# Patient Record
Sex: FEMALE | Race: BLACK OR AFRICAN AMERICAN | NOT HISPANIC OR LATINO | Employment: PART TIME | URBAN - METROPOLITAN AREA
[De-identification: names, ages, dates, MRNs, and addresses within clinical notes are randomized per-mention and may not be internally consistent; named-entity substitution may affect disease eponyms.]

---

## 2017-01-10 ENCOUNTER — TELEPHONE (OUTPATIENT)
Dept: FAMILY MEDICINE | Facility: CLINIC | Age: 52
End: 2017-01-10

## 2017-01-10 NOTE — TELEPHONE ENCOUNTER
I wrote a letter on her behalf.  I cannot say unequivocally that she cannot climb stairs as I have not seen her for almost 3 years.

## 2017-01-10 NOTE — TELEPHONE ENCOUNTER
[facility] phone call message- general phone call:    Reason for call: Pt is in [facility], needs a letter stating she can not do the stairs due to arthritis in both knees. Please call Argenis who is nurse at Center at [phone]. Fax # is [phone].     Return call needed: Yes    OK to leave a message on voice mail? Yes    Primary language: English      needed? No    Call taken on January 10, 2017 at 8:25 AM by Ann Monsivais

## 2017-11-27 ENCOUNTER — OFFICE VISIT (OUTPATIENT)
Dept: FAMILY MEDICINE | Facility: CLINIC | Age: 52
End: 2017-11-27

## 2017-11-27 VITALS
BODY MASS INDEX: 33.16 KG/M2 | HEART RATE: 80 BPM | DIASTOLIC BLOOD PRESSURE: 69 MMHG | HEIGHT: 64 IN | SYSTOLIC BLOOD PRESSURE: 102 MMHG | TEMPERATURE: 98 F | OXYGEN SATURATION: 98 % | WEIGHT: 194.25 LBS

## 2017-11-27 DIAGNOSIS — F19.90 SUBSTANCE USE DISORDER: ICD-10-CM

## 2017-11-27 DIAGNOSIS — F32.1 MODERATE SINGLE CURRENT EPISODE OF MAJOR DEPRESSIVE DISORDER (H): ICD-10-CM

## 2017-11-27 DIAGNOSIS — S49.91XA SHOULDER INJURY, RIGHT, INITIAL ENCOUNTER: Primary | ICD-10-CM

## 2017-11-27 RX ORDER — CITALOPRAM HYDROBROMIDE 10 MG/1
10 TABLET ORAL DAILY
Qty: 30 TABLET | Refills: 1 | Status: SHIPPED | OUTPATIENT
Start: 2017-11-27 | End: 2018-12-18

## 2017-11-27 RX ORDER — CYCLOBENZAPRINE HCL 10 MG
5-10 TABLET ORAL 3 TIMES DAILY PRN
Qty: 30 TABLET | Refills: 1 | Status: SHIPPED | OUTPATIENT
Start: 2017-11-27 | End: 2018-12-18

## 2017-11-27 RX ORDER — NABUMETONE 500 MG/1
1000 TABLET, FILM COATED ORAL DAILY
Qty: 60 TABLET | Refills: 1 | Status: SHIPPED | OUTPATIENT
Start: 2017-11-27 | End: 2018-12-18

## 2017-11-27 RX ORDER — HYDROXYZINE PAMOATE 25 MG/1
25-50 CAPSULE ORAL 3 TIMES DAILY PRN
Qty: 120 CAPSULE | Refills: 1 | Status: SHIPPED | OUTPATIENT
Start: 2017-11-27 | End: 2018-12-18

## 2017-11-27 ASSESSMENT — ANXIETY QUESTIONNAIRES
7. FEELING AFRAID AS IF SOMETHING AWFUL MIGHT HAPPEN: NEARLY EVERY DAY
GAD7 TOTAL SCORE: 20
1. FEELING NERVOUS, ANXIOUS, OR ON EDGE: NEARLY EVERY DAY
3. WORRYING TOO MUCH ABOUT DIFFERENT THINGS: NEARLY EVERY DAY
6. BECOMING EASILY ANNOYED OR IRRITABLE: NEARLY EVERY DAY
2. NOT BEING ABLE TO STOP OR CONTROL WORRYING: NEARLY EVERY DAY
IF YOU CHECKED OFF ANY PROBLEMS ON THIS QUESTIONNAIRE, HOW DIFFICULT HAVE THESE PROBLEMS MADE IT FOR YOU TO DO YOUR WORK, TAKE CARE OF THINGS AT HOME, OR GET ALONG WITH OTHER PEOPLE: EXTREMELY DIFFICULT
5. BEING SO RESTLESS THAT IT IS HARD TO SIT STILL: MORE THAN HALF THE DAYS

## 2017-11-27 ASSESSMENT — PATIENT HEALTH QUESTIONNAIRE - PHQ9
5. POOR APPETITE OR OVEREATING: NEARLY EVERY DAY
SUM OF ALL RESPONSES TO PHQ QUESTIONS 1-9: 18

## 2017-11-27 NOTE — LETTER
RETURN TO WORK/SCHOOL FORM    11/27/2017    Re: Martha Caputo  1965      To Whom It May Concern:     Martha Caputo was seen in clinic today.  She may return to work with restrictions on 11/28/17          Restrictions:  limited to light duty - lifting no greater than 5lbs with right arm.      Chapo Hwang, DO  11/27/2017 4:00 PM

## 2017-11-27 NOTE — PROGRESS NOTES
SUBJECTIVE       Martha Caputo is a 52 year old  female with a PMH significant for:     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)     Breast cancer screening     Breast pain, left     DJD (degenerative joint disease) of knee     Personal history of malignant neoplasm of breast     Primary osteoarthritis of both knees     Major depressive disorder, recurrent episode, moderate (H)     Patient presents with:  Shoulder Pain: right - fell about a month ago - pain comes and goes - decreased strength and control which is causing issues with work -    Drug Problem: crack   Alcohol Problem: wants to seek treatment  Referral: therapy - PTSD, depression, anxiety, anger issues  Referral: to have port removed post breast cancer      1) She says she tripped on her own feet 1 month ago and fell.  She landed on her right shoulder and has had pain since.  It is worse with movement and reaching above head or behind back.  She works as a  and has been unable to work due to pain.  She has trouble sleeping at night. Tried ibuprofen, naproxen and tylenol. Had some bruising which is now gone.    2) She has a history of cocaine and alcohol addiction and unfortunately relapsed lately.  She is interested in treatment and would like resources/referral. She also says her depressive symptoms are bad again.  She thinks this is independent of her substance use.  She has a lot of generalized anxiety, trouble sleeping, lots of fatigue, and poor motivation lately. She was previously on wellbutrin which was not very helpful. In the past was on celexa which she thinks helped her anxiety.  Hx of suicidal thoughts in the past with prior attempts, but denied any thoughts, plan, or intent recently.  She feels safe at home.    PMH, Medications and Allergies were reviewed and updated as needed.    ROS: No numbness or weakness.  No edema. She does not some muscle spasms  "in her shoulder/neck.        OBJECTIVE     Vitals:    11/27/17 1515   BP: 102/69   Pulse: 80   Temp: 98  F (36.7  C)   TempSrc: Oral   SpO2: 98%   Weight: 194 lb 4 oz (88.1 kg)   Height: 5' 4.17\" (163 cm)     Body mass index is 33.16 kg/(m^2).    General :  healthy and alert, no distress  HEENT:  PERRL  Cardiovascular: regular rate and rhythm, no gallops, rubs or murmurs   Respiratory:  lungs clear, no rales, rhonchi or wheezes, normal diaphragmatic excursion  Musculoskeletal: Right shoulder normal to visual inspection, tender over superior aspect of shoulder joint and along trap muscle. Reduced active ROM especially when reaching behind back or above head due to pain.  Rotator cuff testing: limited by pain with empty can testing, but strength seemed intact.  Normal subscap and bicep strength.  Normal  strength.  Skin:   no lesions or rashes   Neurological:  normal gait, no gross defects.  Normal RUE sensation. Negative spurlings.  Psychiatric:  appropriate mood and affect                        ASSESSMENT AND PLAN     (S49.91XA) Shoulder injury, right, initial encounter  (primary encounter diagnosis)  Comment: Pain for last 3 weeks following a fall.  Given minimal improvement we did an x-ray today and preliminary read is normal. Will start with some physical therapy and try nabumetone (advised avoiding other NSAIDs while on this) in addition to tylenol.  Will try flexeril for spasms, hopefully to help her sleep.  Will consider further imaging if not improving in therapy after a few weeks.  Plan: PHYSICAL THERAPY REFERRAL, XR Shoulder Right         G/E 3 Views, nabumetone (RELAFEN) 500 MG         tablet, cyclobenzaprine (FLEXERIL) 10 MG tablet          (F32.1) Moderate single current episode of major depressive disorder (H)  Comment: Worsening symptoms lately.  Discussed options and she opted to restart medication.  Celexa helped in the past so will try that again.  Will provide vistaril prn for anxiety as well. "  Consider getting into therapy once plan for substance treatment is established.  Plan: citalopram (CELEXA) 10 MG tablet, hydrOXYzine         (VISTARIL) 25 MG capsule          (F19.90) Substance use disorder  Comment/Plan: Cocaine and alcohol lately.  I provided the number for HE Rule 25 intake which she plans on calling tonight to figure out next steps.  Will see her back in a few weeks to follow-up on progress.    RTC in a few weeks for follow up or sooner if develops new or worsening symptoms.    Discussed with MD Chapo Leavitt, DO PGY3  [facility]    This note was created with help of Dragon dictation system. Grammatical /typing errors are not intentional.

## 2017-11-27 NOTE — MR AVS SNAPSHOT
After Visit Summary   11/27/2017    Martha Caputo    MRN: [id number]           Patient Information     Date Of Birth          1965        Visit Information        Provider Department      11/27/2017 3:10 PM Chapo Hwang DO [facility]        Today's Diagnoses     Shoulder injury, right, initial encounter    -  1    Moderate single current episode of major depressive disorder (H)          Care Instructions    Thank you for coming to clinic today.  Please do not hesitate to call or return if you have any questions.    - Call [phone] for Rule 25 intake    -  medications, avoid other NSAIDs such as ibuprofen or aleve while on relafen.  - Continue tylenol 1000mg three times daily  - Schedule physical therapy referral  - Return in a few weeks for follow-up or sooner if you have new concerns    Sincerely,  Dr. Hwang            Follow-ups after your visit        Additional Services     PHYSICAL THERAPY REFERRAL       PT/OT REFERRAL  Martha Caputo  1965  Phone #: [phone] (home)    needed: No  Language: English    PT/OT  Facility:   Other: Per pt preference    History: right shoulder injury, persistent pain    Precautions/Contraindications: None    Imaging Studies: X-Ray    Surgical Procedure/Test Results: None    Treatment Goals:   Increase: Flexibility, Strength and ROM  Decrease: Edema, Pain and Spasm    Prognosis: good    Visits: Up to 12    Evaluate: Evaluate and treat    Plan: Manual Therapy, Flexibility Exercise and Strength Exercise                  Future tests that were ordered for you today     Open Future Orders        Priority Expected Expires Ordered    PHYSICAL THERAPY REFERRAL Routine  12/11/2017 11/27/2017            Who to contact     Please call your clinic at [phone] to:    Ask questions about your health    Make or cancel appointments    Discuss your medicines    Learn about your test results    Speak to your doctor   If you have  "compliments or concerns about an experience at your clinic, or if you wish to file a complaint, please contact [facility] Patient Relations at [phone] or email us at [email or web address]         Additional Information About Your Visit        [employer] Information     [employer] is an electronic gateway that provides easy, online access to your medical records. With [employer], you can request a clinic appointment, read your test results, renew a prescription or communicate with your care team.     To sign up for [employer] visit the website at [email or web address]   You will be asked to enter the access code listed below, as well as some personal information. Please follow the directions to create your username and password.     Your access code is: [email or web address]  Expires: 2018  4:11 PM     Your access code will  in 90 days. If you need help or a new code, please contact your [facility] or call [phone] for assistance.        Care EveryWhere ID     This is your Care EveryWhere ID. This could be used by other organizations to access your [place] medical records  [id number]        Your Vitals Were     Pulse Temperature Height Pulse Oximetry Breastfeeding? BMI (Body Mass Index)    80 98  F (36.7  C) (Oral) 5' 4.17\" (163 cm) 98% No 33.16 kg/m2       Blood Pressure from Last 3 Encounters:   17 102/69   16 122/82   16 108/67    Weight from Last 3 Encounters:   17 194 lb 4 oz (88.1 kg)   16 191 lb 4 oz (86.8 kg)   16 194 lb 6.4 oz (88.2 kg)              We Performed the Following     XR Shoulder Right G/E 3 Views          Today's Medication Changes          These changes are accurate as of: 17  4:11 PM.  If you have any questions, ask your nurse or doctor.               Start taking these medicines.        Dose/Directions    citalopram 10 MG tablet   Commonly known as:  celeXA   Used for:  " Moderate single current episode of major depressive disorder (H)   Started by:  Chapo Hwang DO        Dose:  10 mg   Take 1 tablet (10 mg) by mouth daily   Quantity:  30 tablet   Refills:  1       cyclobenzaprine 10 MG tablet   Commonly known as:  FLEXERIL   Used for:  Shoulder injury, right, initial encounter   Started by:  Chapo Hwang DO        Dose:  5-10 mg   Take 0.5-1 tablets (5-10 mg) by mouth 3 times daily as needed for muscle spasms   Quantity:  30 tablet   Refills:  1       hydrOXYzine 25 MG capsule   Commonly known as:  VISTARIL   Used for:  Moderate single current episode of major depressive disorder (H)   Started by:  Chapo Hwang DO        Dose:  25-50 mg   Take 1-2 capsules (25-50 mg) by mouth 3 times daily as needed for anxiety   Quantity:  120 capsule   Refills:  1       nabumetone 500 MG tablet   Commonly known as:  RELAFEN   Used for:  Shoulder injury, right, initial encounter   Started by:  Chapo Hwang DO        Dose:  1000 mg   Take 2 tablets (1,000 mg) by mouth daily   Quantity:  60 tablet   Refills:  1            Where to get your medicines      These medications were sent to [facility] - [place], [place] - [address], [place] [address]     Phone:  [phone]     citalopram 10 MG tablet    cyclobenzaprine 10 MG tablet    hydrOXYzine 25 MG capsule    nabumetone 500 MG tablet                Primary Care Provider Office Phone # Fax #    Joby Means [phone]       [address] [address]        Equal Access to Services     [facility] Hospital[facility]: Hadii valerie ku hadasho Soomaali, waaxda luqadaha, qaybta kaalmada adeegyada, juno carrasco. So [facility] [phone].    ATENCIÓN: Si habla español, tiene a rogers disposición servicios gratuitos de asistencia lingüística. Llame al [phone].    We comply with applicable federal civil rights laws and [place] laws.  We do not discriminate on the basis of race, color, national origin, age, disability, sex, sexual orientation, or gender identity.            Thank you!     Thank you for choosing [facility]  for your care. Our goal is always to provide you with excellent care. Hearing back from our patients is one way we can continue to improve our services. Please take a few minutes to complete the written survey that you may receive in the mail after your visit with us. Thank you!             Your Updated Medication List - Protect others around you: Learn how to safely use, store and throw away your medicines at www.disposemymeds.org.          This list is accurate as of: 11/27/17  4:11 PM.  Always use your most recent med list.                   Brand Name Dispense Instructions for use Diagnosis    citalopram 10 MG tablet    celeXA    30 tablet    Take 1 tablet (10 mg) by mouth daily    Moderate single current episode of major depressive disorder (H)       cyclobenzaprine 10 MG tablet    FLEXERIL    30 tablet    Take 0.5-1 tablets (5-10 mg) by mouth 3 times daily as needed for muscle spasms    Shoulder injury, right, initial encounter       hydrOXYzine 25 MG capsule    VISTARIL    120 capsule    Take 1-2 capsules (25-50 mg) by mouth 3 times daily as needed for anxiety    Moderate single current episode of major depressive disorder (H)       nabumetone 500 MG tablet    RELAFEN    60 tablet    Take 2 tablets (1,000 mg) by mouth daily    Shoulder injury, right, initial encounter

## 2017-11-27 NOTE — PROGRESS NOTES
"       GIUSEPPE Caputo is a 52 year old  female with a PMH significant for:     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)     Breast cancer screening     Breast pain, left     DJD (degenerative joint disease) of knee     Personal history of malignant neoplasm of breast     Primary osteoarthritis of both knees     Major depressive disorder, recurrent episode, moderate (H)     Patient presents with:  Shoulder Pain: right - fell about a month ago - pain comes and goes - decreased strength and control which is causing issues with work -    Drug Problem: crack   Alcohol Problem: wants to seek treatment  Referral: therapy - PTSD, depression, anxiety, anger issues  Referral: to have port removed post breast cancer      Fell 1 mo ago, tripped on own feet    Pain is not improving.  Has not been seen yet.  Off work for 3 weeks.  Trouble sleeping  Due to right shoulder pain.        PMH, Medications and Allergies were reviewed and updated as needed.    ROS:        OBJECTIVE     Vitals:    11/27/17 1515   BP: 102/69   Pulse: 80   Temp: 98  F (36.7  C)   TempSrc: Oral   SpO2: 98%   Weight: 194 lb 4 oz (88.1 kg)   Height: 5' 4.17\" (163 cm)     Body mass index is 33.16 kg/(m^2).    General :  healthy and alert, no distress  HEENT:  PERRL  Cardiovascular: regular rate and rhythm, no gallops, rubs or murmurs   Respiratory:  lungs clear, no rales, rhonchi or wheezes, normal diaphragmatic excursion  Musculoskeletal: no edema  Skin:   no lesions or rashes   Neurological:  normal gait, no gross defects  Psychiatric:  appropriate mood and affect                      Hematological: normal cervical and supraclavicular lymph nodes  Gastrointestinal:       abdomen soft, non-tender, non-distended, no organomegaly or masses    No results found for this or any previous visit (from the past 24 hour(s)).    ASSESSMENT AND PLAN     There are no diagnoses linked to " this encounter.    RTC in *** for follow up of *** or sooner if develops new or worsening symptoms.    Discussed with  {BTPRECEPTORS:[id number]}    Chapo Hawng, DO PGY3  [facility]    This note was created with help of Dragon dictation system. Grammatical /typing errors are not intentional.

## 2017-11-27 NOTE — LETTER
RETURN TO WORK/SCHOOL FORM    11/27/2017    Re: Martha Caputo  1965      To Whom It May Concern:     Marhta Caputo was seen in clinic today.  She may return to work upon successful completion of treatment program and follow-up assessments.      Restrictions:  Unable to work.  Next appointment: 2-4 weeks      Chapo Hwang DO  11/27/2017 4:15 PM

## 2017-11-28 ENCOUNTER — AMBULATORY - HEALTHEAST (OUTPATIENT)
Dept: ADMINISTRATIVE | Facility: REHABILITATION | Age: 52
End: 2017-11-28

## 2017-11-28 DIAGNOSIS — S49.91XA INJURY OF SHOULDER, RIGHT, INITIAL ENCOUNTER: ICD-10-CM

## 2017-11-28 ASSESSMENT — ANXIETY QUESTIONNAIRES: GAD7 TOTAL SCORE: 20

## 2017-12-05 NOTE — PROGRESS NOTES
Preceptor attestation:  Patient seen and discussed with the resident. Assessment and plan reviewed with resident and agreed upon.  Supervising physician: Arnie Watt  [facility]

## 2018-01-20 ENCOUNTER — HEALTH MAINTENANCE LETTER (OUTPATIENT)
Age: 53
End: 2018-01-20

## 2018-12-18 ENCOUNTER — RECORDS - HEALTHEAST (OUTPATIENT)
Dept: ADMINISTRATIVE | Facility: OTHER | Age: 53
End: 2018-12-18

## 2018-12-18 ENCOUNTER — TELEPHONE (OUTPATIENT)
Dept: FAMILY MEDICINE | Facility: CLINIC | Age: 53
End: 2018-12-18

## 2018-12-18 ENCOUNTER — OFFICE VISIT (OUTPATIENT)
Dept: FAMILY MEDICINE | Facility: CLINIC | Age: 53
End: 2018-12-18
Payer: MEDICAID

## 2018-12-18 DIAGNOSIS — Z12.11 SCREENING FOR COLON CANCER: ICD-10-CM

## 2018-12-18 DIAGNOSIS — Z12.4 SCREENING FOR CERVICAL CANCER: ICD-10-CM

## 2018-12-18 DIAGNOSIS — Z00.00 ROUTINE GENERAL MEDICAL EXAMINATION AT A HEALTH CARE FACILITY: ICD-10-CM

## 2018-12-18 LAB
ALBUMIN SERPL BCP-MCNC: 4 G/DL (ref 3.5–5)
ALP SERPL-CCNC: 53 U/L (ref 45–120)
ALT SERPL W/O P-5'-P-CCNC: 25 U/L (ref 0–45)
ANION GAP SERPL CALCULATED.3IONS-SCNC: 9 MMOL/L (ref 5–18)
AST SERPL-CCNC: 21 U/L (ref 0–40)
BILIRUB SERPL-MCNC: 0.3 MG/DL (ref 0–1)
BUN SERPL-MCNC: 6 MG/DL (ref 8–22)
CALCIUM SERPL-MCNC: 9.8 MG/DL (ref 8.5–10.5)
CHLORIDE SERPL-SCNC: 106 MMOL/L (ref 98–107)
CHOLEST SERPL-MCNC: 208 MG/DL
CO2 SERPL-SCNC: 27 MMOL/L (ref 22–31)
CREAT SERPL-MCNC: 0.65 MG/DL (ref 0.6–1.1)
FASTING?: ABNORMAL
GLUCOSE SERPL-MCNC: 98 MG/DL (ref 70–125)
HCT VFR BLD AUTO: 44.5 % (ref 35–47)
HDLC SERPL-MCNC: 39 MG/DL
HEMOGLOBIN: 13.7 G/DL (ref 11.7–15.7)
LDLC SERPL CALC-MCNC: 121 MG/DL
MCH RBC QN AUTO: 29.6 PG (ref 26.5–35)
MCHC RBC AUTO-ENTMCNC: 30.8 G/DL (ref 32–36)
MCV RBC AUTO: 96.1 FL (ref 78–100)
PLATELET # BLD AUTO: 333 K/UL (ref 150–450)
POTASSIUM SERPL-SCNC: 4.3 MMOL/L (ref 3.5–5)
PROT SERPL-MCNC: 6.9 G/DL (ref 6–8)
RBC # BLD AUTO: 4.6 M/UL (ref 3.8–5.2)
SODIUM SERPL-SCNC: 142 MMOL/L (ref 136–145)
TRIGL SERPL-MCNC: 238 MG/DL
WBC # BLD AUTO: 8.3 K/UL (ref 4–11)

## 2018-12-18 RX ORDER — POLYETHYLENE GLYCOL 3350 17 G/17G
1 POWDER, FOR SOLUTION ORAL DAILY
Qty: 90 PACKET | Refills: 3 | Status: SHIPPED | OUTPATIENT
Start: 2018-12-18 | End: 2018-12-20

## 2018-12-18 NOTE — PATIENT INSTRUCTIONS
Thank you for coming to [facility] for your care. It was a pleasure to take care of you!    - Great job on taking good care of your health, KEEP IT UP!  - Start to try and eat healthier as we discussed  - Schedule colonoscopy for the colon cancer screening.  - Schedule the visit with general surgery to get your port removed  - Labs today, pap today  - I have ordered you vitamin supplements.   - I will call you with the results of your test.  - Return to clinic in 1 week for follow up of symptoms of the other issues discussed today.     Ferdinand Ramos MD    Preventive Health Recommendations  Female Ages 50 - 64    Yearly exam: See your health care provider every year in order to  o Review health changes.   o Discuss preventive care.    o Review your medicines if your doctor has prescribed any.      Get a Pap test every three years (unless you have an abnormal result and your provider advises testing more often).    If you get Pap tests with HPV test, you only need to test every 5 years, unless you have an abnormal result.     You do not need a Pap test if your uterus was removed (hysterectomy) and you have not had cancer.    You should be tested each year for STDs (sexually transmitted diseases) if you're at risk.     Have a mammogram every 1 to 2 years.    Have a colonoscopy at age 50, or have a yearly FIT test (stool test). These exams screen for colon cancer.      Have a cholesterol test every 5 years, or more often if advised.    Have a diabetes test (fasting glucose) every three years. If you are at risk for diabetes, you should have this test more often.     If you are at risk for osteoporosis (brittle bone disease), think about having a bone density scan (DEXA).    Shots: Get a flu shot each year. Get a tetanus shot every 10 years.    Nutrition:     Eat at least 5 servings of fruits and vegetables each day.    Eat whole-grain bread, whole-wheat pasta and brown rice instead of white  grains and rice.    Get adequate Calcium and Vitamin D.     Lifestyle    Exercise at least 150 minutes a week (30 minutes a day, 5 days a week). This will help you control your weight and prevent disease.    Limit alcohol to one drink per day.    No smoking.     Wear sunscreen to prevent skin cancer.     See your dentist every six months for an exam and cleaning.    See your eye doctor every 1 to 2 years.    Patient Education     Colorectal Cancer Screening    Colorectal cancer is cancer in the colon or rectum. It is a leading cause of cancer deaths in the [place] But when this cancer is found and removed early, the chances of a full recovery are very good. Because colorectal cancer rarely causes symptoms in its early stages, screening for the disease is important. It s even more crucial if you have risk factors for the disease. Learn more about colorectal cancer and its risk factors. Then talk to your healthcare provider about being screened.  Risk factors for colorectal cancer  Your risk of having colorectal cancer increases if you:    Are 50 years of age or older    Have a family history or personal history of colorectal cancer or polyps    Have a personal history of type 2 diabetes, Crohn s disease, or ulcerative colitis    Have an inherited genetic syndrome like Zuniga syndrome (HNPCC) or familial adenomatous polyposis (FAP)    Are very overweight    Are not physically active    Smoke    Drink a lot of alcohol    Eat a lot of red or processed meat  The colon and rectum  Waste from food you eat enters the colon from the small intestine. As it travels through the colon, the waste (stool) loses water and becomes more solid. Intestinal muscles push it toward the sigmoid colon. This is the last section of the colon. Stool then moves into the rectum, where it s stored until it s ready to leave the body during a bowel movement.  How colorectal cancer starts  Polyps are growths that form on the inner lining of the colon or  rectum. Most are benign, which means they aren t cancer. But over time, some polyps can become cancer (malignant). This happens when cells in these polyps begin growing abnormally. In time, malignant cells invade more of the colon and rectum. The cancer may also spread to nearby organs or lymph nodes or to other parts of the body. Finding and removing polyps can help prevent cancer from forming.  Your screening  Screening means looking for a health problem before you have symptoms. During screening for colorectal cancer, your healthcare provider will ask about your health history, examine you, and do 1 or more tests. To start, you may have:    Health history questions to answer. Your healthcare provider will ask about your health history. Mention if a family member has had colon cancer or polyps. Also mention any health problems you have had in the past.    Digital rectal exam (RANDELL). During a RANDELL, the healthcare provider inserts a lubricated gloved finger into the rectum. The test is painless and takes less than a minute. This test alone is not enough to screen for colorectal cancer. You will also need one of the below tests.  Screening test choices  Fecal occult blood test (FOBT) or fecal immunochemical test (FIT)  These tests check for blood in stool that you can t see (hidden or occult blood). Hidden blood may be a sign of colon polyps or cancer. A small sample of stool is tested for blood in a laboratory. Most often, you collect this sample at home using a kit your healthcare provider gives you. Follow the instructions carefully for using this kit. You might need to not eat certain foods and not take certain medicines before the test, as directed.  Stool DNA test  This test looks for DNA changes in cells in the stool. These DNA changes might be signs of cancer. It also looks for hidden blood in stool. For this test, you collect an entire bowel movement. This is done using a special container put in the toilet.  The sample is then sent to a lab for testing.  Barium enema with contrast (double-contrast barium enema)  This test uses X-rays to create images of the entire colon and rectum. The day before this test, you will need to do a bowel prep to clean out the colon and rectum. A bowel prep is a liquid diet plus strong laxatives or enemas. You will be awake for the test, but you may be given medicine to help you relax. At the start of the test, a healthcare provider who specializes in imaging tests (radiologist) places a soft tube into the rectum. The tube is used to fill the colon with a contrast liquid (barium) and air. This can be uncomfortable for some people. The liquid helps the colon show up clearly on the X-rays. Because the test uses X-rays, it exposes you to a small amount of radiation.  Virtual colonoscopy  This exam is also called a CT colonography. It uses a series of X-ray photographs to create a 3-D view of the colon and rectum. The day before the test, you will need to do a bowel prep to clean out your colon. Your healthcare provider will give you instructions on how to do this. During the procedure, you will lie on a table that is part of a special X-ray machine called a CT scanner. A small tube will be placed into your rectum to fill the colon and rectum with air. This can be uncomfortable for some people. Then, the table will move into the machine and pictures will be taken of your colon and rectum. A computer will combine these photos to create a 3-D picture. Because the test uses X-rays, it exposes you to a small amount of radiation.  Scope exams  Here are 2 types of scope exams:    Colonoscopy. This test can be used to find and remove polyps anywhere in the colon or rectum. The day before the test, you will do a bowel prep. This is a liquid diet plus a strong laxative solution or an enema. The bowel prep will cleanse your colon. You will be given instructions for this. Just before the test, you are  given a medicine to make you sleepy. Then, a long, flexible, lighted tube called a colonoscope is gently inserted into the rectum and guided through the entire colon. Images of the colon are viewed on a video screen. Any polyps that are found are removed and sent to a lab for testing. If a polyp can t be removed, a sample of tissue is taken and the polyp might be removed later during surgery. You will need to bring someone with you to drive you home after this test. Colonoscopy is the only screening test that lets your healthcare provider see the entire colon and rectum. This test also lets your healthcare provider remove any pieces of tissue that need to be looked at by a lab. If something suspicious is found using any other tests, you will likely need a colonoscopy.    Sigmoidoscopy. This test is similar to colonoscopy, but focuses only on the sigmoid colon and rectum. As with colonoscopy, bowel prep must be done the day before this test. It might not need to be as complete as the bowel prep for a colonoscopy. You are awake during the procedure, but you may be given medicine to help you relax. During the test, the healthcare provider guides a thin, flexible, lighted tube called a sigmoidoscope through your rectum and lower colon. The images are displayed on a video screen. Polyps are removed, if possible, and sent to a lab for testing.     When to call your healthcare provider after a test  Call your healthcare provider if you have any of the following after any screening test:    Bleeding    Fever of 100.4 F (38 C) or higher, or as directed by your healthcare provider    Abdominal pain    Vomiting   Date Last Reviewed: 12/1/2017 2000-2018 [employer]. [address] [place] [address], [place] [address]. All rights reserved. This information is not intended as a substitute for professional medical care. Always follow your healthcare professional's instructions.       COLORECTAL SURGERY REFERRAL & GENERAL  SURG ADULT REFERRAL  December 18, 2018 at 4:17 pm called Martha and left a message to call back regarding 2 referrals. [facility]  December 19, 2018 at 11:36 am called Martha and left a message to call back regarding 2 referrals. [facility]  December 21, 2018 at 1:18 pm letter mailed. [facility]

## 2018-12-18 NOTE — LETTER
December 21, 2018      Martha Caputo  [address]  [place] [address]        Dear Martha,    We tried calling you regarding an appointments for COLORECTAL SURGERY REFERRAL & GENERAL SURG ADULT REFERRAL however the we were unable to reach you.     Please call us at [facility] and we will assist you with schedule this appointment.      Sincerely,    [facility]  Referral Department    [phone]

## 2018-12-18 NOTE — PROGRESS NOTES
Female Physical Note         HPI         She presents for   Chief Complaint   Patient presents with     Physical     has some sharp pain on her left lower abominal area, she has polyps in the past and never went back in to get the last one removed      Concerns today:   - has mild left lower quadrant pain that comes and goes.   - has had bilateral knee pain has know DJD  - Doesn't feel like her current prescription is working well for her, needs new glasses. Will see an optometrist.   - last use of her drugs was about 2 months ago, etoh and crack cocaine are her drug of choice, has been going to [facility] and her support groups.   - Hx of bilateral breast cancer, s/p mastectomy f/u was initially every 6 months and now is every one year mammogram and was just seen at [facility]. \Hospital [facility] a New Name and Outlook.\""         Review of Systems:     CONSTITUTIONAL: No fatigue, unintentional weight loss, or fevers  HEENT: No concerns related to head, eyes, ears, nose or throat  CV: No chest pain, shortness of breath, or palpitations  RESPIRATORY: No cough  BREASTS: No masses or discharge, hx of mastectomy.   GI: No abdominal pain, nausea, vomiting, diarrhea or constipation  : No dysuria, urinary frequency, hematuria, or abnormal vaginal bleeding  MS: No muscle, bone, or joint pain  NEURO: No headache, dizziness, or weakness  ENDOCRINE: No temperature intolerance  HEME: No easy bruising or bleeding problems  PSYCHIATRIC: Negative for mood changes    Sexually Active: Yes   Sexual concerns: No   Contraception: No, has been with her partner for about 5 years who is her fiance.   Menses: had early menopause at 40, she thinks due to chemotherapy.   STD History: No   Last Pap Smear Date: 11/17/14, was normal then.   Abnormal Pap History: Yes, states that [facility].     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)     Breast cancer screening     Breast pain, left     DJD  (degenerative joint disease) of knee     Personal history of malignant neoplasm of breast     Primary osteoarthritis of both knees     Major depressive disorder, recurrent episode, moderate (H)     Atypical depressive disease     Closed fracture of shaft of humerus     Polysubstance abuse (H)     Current Outpatient Medications   Medication Sig Dispense Refill     bisacodyl (BISACODYL LAXATIVE) 5 MG EC tablet Take 2 tablets (10mg) as directed. 2 tablet 0     magnesium citrate solution Magnesium citrate 10 oz as directed. NO red liquids. 296 mL 0     multivitamin (CENTRUM) chewable tablet Take 1 tablet by mouth daily 90 tablet 3     polyethylene glycol (MIRALAX) packet Miralax powder 8.3 oz bottle (238 gm) as directed 238 g 0     Past Medical History:   Diagnosis Date     Arthritis      Breast cancer (H) 1999    History of bilateral breast cancer status mastectomy in 2005 and left breast conserving therapy approximately 2000.     Depressive disorder      History of drug abuse in remission     6 month sober as of 6/2014     Family History   Problem Relation Age of Onset     Diabetes Maternal Grandmother      Coronary Artery Disease Maternal Grandmother      Diabetes Paternal Grandmother      Breast Cancer Paternal Grandmother      Cancer - colorectal No family hx of      Ovarian Cancer No family hx of      Prostate Cancer No family hx of        Family History and past Medical History reviewed and unchanged/updated.  Social History     Tobacco Use     Smoking status: Current Some Day Smoker     Packs/day: 0.25     Smokeless tobacco: Never Used     Tobacco comment: 4 a day   Substance Use Topics     Alcohol use: Yes     Comment: 3 glasses of wine, 4 cans of beer plus a pint of liquor daily     Marital status: , currently partner.   Children: 3 kids, one boy and 3 girls one adopted.   Employed:  No, works intermittently.     Has anyone hurt you physically, for example by pushing, hitting, slapping or kicking you  or forcing you to have sex? No   Do you feel threatened or controlled by a partner, ex-partner or anyone in your life? No     RISK BEHAVIORS AND HEALTHY HABITS:  Tobacco Use/Smoking: Yes, smokes about 1 - 5 cigarettes a day.   Illicit Drug Use: Yes, been sober for 2 months.   Do you use alcohol? No   Diet (5-7 servings of fruits/veg daily): Yes, eats a lot of junk food and soul food.  Exercise (30 min accumulated most days): No formal,   Dental Care: Yes   Calcium in diet?:  Yes  Seat Belt Use: Yes     Immunization History   Administered Date(s) Administered     TDAP Vaccine (Boostrix) 11/17/2014    Reviewed Immunization Record Today         Physical Exam:     No data found.  There is no height or weight on file to calculate BMI.    GENERAL: Alert, oriented, no acute distress, pleasant  HEENT: Eyes grossly normal, extraocular movements normal  NECK: No tenderness, lymphadenopathy, masses, or stiffness.  Thyroid normal to palpation.  RESP: Lungs clear to auscultation - no crackles, rhonchi, or wheezes  CV: Regular rate and rhythm, no rubs or clicks, no murmurs  BREASTS: Right breast notable for total mastectomy, no masses noted, no lymphadenopathy. Left breast positive for scarring from lumpectomy, no masses or lymphadenopathy noted.   ABDOMEN: Soft, no tenderness, masses, or organ enlargement  MS: Extremities grossly normal, no edema, good range of motion  SKIN: No rashes or lesions  NEURO: DTRs symmetric  PELVIC: Cervix appears normal , notable for polyps at the cervical os, no tenderness, masses, or organ enlargement  PSYCH: Normal mood and affect  Lymphatics: Normal cervical, axillary, and inguinal lymph nodes    Results for orders placed or performed in visit on 12/18/18   CBC with Plt ([facility])   Result Value Ref Range    WBC 8.3 4.0 - 11.0 K/uL    RBC 4.6 3.8 - 5.2 M/uL    Hemoglobin 13.7 11.7 - 15.7 g/dL    Hematocrit 44.5 35.0 - 47.0 %    MCV 96.1 78.0 - 100.0 fL    MCH 29.6 26.5 - 35.0    MCHC 30.8 (L) 32.0  - 36.0 g/dL    Platelets 333.0 150.0 - 450.0 K/uL   Comprehensive Metabolic ([facility]) - Results > 1 hr   Result Value Ref Range    Sodium 142 136 - 145 mmol/L    Potassium 4.3 3.5 - 5.0 mmol/L    Chloride 106 98 - 107 mmol/L    CO2, Total 27 22 - 31 mmol/L    Anion Gap 9 5 - 18 mmol/L    Glucose 98 70 - 125 mg/dL    Urea Nitrogen 6 (L) 8 - 22 mg/dL    Creatinine 0.65 0.60 - 1.10 mg/dL    GFR Estimate If Black >60 >60 mL/min/1.73m2    GFR Estimate >60 >60 mL/min/1.73m2    Bilirubin Total 0.3 0.0 - 1.0 mg/dL    Calcium 9.8 8.5 - 10.5 mg/dL    Protein Total 6.9 6.0 - 8.0 g/dL    Albumin 4.0 3.5 - 5.0 g/dL    Alkaline Phosphatase 53 45 - 120 U/L    AST (SGOT) 21 0 - 40 U/L    ALT (SGPT) 25 0 - 45 U/L    Narrative    Test performed by:  [facility]  [address] [place] [address]  Fasting Glucose reference range is 70-99 mg/dL per  American Diabetes Association (ADA) guidelines.   Lipid [place] ([facility]) - Results > 1 hr   Result Value Ref Range    Cholesterol 208 (H) <=199 mg/dL    Triglycerides 238 (H) <=149 mg/dL    HDL Cholesterol 39 (L) >=50 mg/dL    LDL Cholesterol Calculated 121 <=129 mg/dL    Fasting? Unknown     Narrative    Test performed by:  [facility]  [address] [place] [address]     Assessment and Plan     Martha was seen today for physical.    Diagnoses and all orders for this visit:    Routine general medical examination at a health care facility  Patient coming in for a full physical today. Has complaints as above but would like to wait on addressing these until her next visit so that these can be addressed.   -     GYN Cytology ([facility])  -     GENERAL SURG ADULT REFERRAL; Future  -     multivitamin (CENTRUM) chewable tablet; Take 1 tablet by mouth daily  -     CBC with Plt ([facility])  -     Comprehensive Metabolic ([facility]) - Results > 1 hr  -     Lipid [place] ([facility]) - Results > 1 hr    Screening for colon cancer  -     COLORECTAL SURGERY  REFERRAL  -     bisacodyl (BISACODYL LAXATIVE) 5 MG EC tablet; Take 2 tablets (10mg) as directed.  -     polyethylene glycol (MIRALAX) packet; Miralax powder 8.3 oz bottle (238 gm) as directed  -     magnesium citrate solution; Magnesium citrate 10 oz as directed. NO red liquids.    Screening for cervical cancer  - Pap completed today.     Options for treatment and/or follow-up care were reviewed with the patient. Martha Caputo was engaged and actively involved in the decision making process. She verbalized understanding of the options discussed and was satisfied with the final plan.    Patient discussed with attending physician Dr. Andre Lam who agrees withplan    Ferdinand Ramos, PGY2

## 2018-12-18 NOTE — PROGRESS NOTES
Preceptor Attestation:   Patient seen, evaluated and discussed with the resident. I have verified the content of the note, which accurately reflects my assessment of the patient and the plan of care.   Supervising Physician:  Andre Blair MD

## 2018-12-18 NOTE — TELEPHONE ENCOUNTER
[facility] phone call message- medication clarification/question:    Full Medication Name:       polyethylene glycol (MIRALAX) packet    Bisacodyl (DULCOLAX BOWEL PREP KIT) KIT    Question: Need clarification of the above medications.  Biscodyl does not com in a kit form and the miralax is a packet form.  Please call with clarifications.    Pharmacy confirmed as    [id number] - [place], [place] - [address] [facility] [facility] - [place] - [address] [id number] - [place] - [address]: Yes    OK to leave a message on voice mail? Yes    Primary language: English      needed? No    Call taken on December 18, 2018 at 12:46 PM by Lupis Bermudez

## 2018-12-19 LAB
FINAL DIAGNOSIS: NORMAL
HPV 16 DNA: NEGATIVE
HPV 18 DNA: NEGATIVE
HPV SOURCE: NORMAL
OTHER HR HPV: NEGATIVE
SPECIMEN DESCRIPTION: NORMAL

## 2018-12-20 RX ORDER — POLYETHYLENE GLYCOL 3350 17 G/17G
POWDER, FOR SOLUTION ORAL
Qty: 238 G | Refills: 0 | Status: SHIPPED | OUTPATIENT
Start: 2018-12-20 | End: 2019-12-20

## 2018-12-20 RX ORDER — BISACODYL 5 MG/1
TABLET, DELAYED RELEASE ORAL
Qty: 2 TABLET | Refills: 0 | Status: SHIPPED | OUTPATIENT
Start: 2018-12-20 | End: 2019-01-19

## 2018-12-22 ENCOUNTER — RECORDS - HEALTHEAST (OUTPATIENT)
Dept: ADMINISTRATIVE | Facility: OTHER | Age: 53
End: 2018-12-22

## 2018-12-22 LAB
CYTOLOGY CVX/VAG DOC THIN PREP: NORMAL
HIGH RISK?: YES
HPV REFLEX?: NORMAL
LAB AP ABNORMAL BLEEDING: NO
LAB AP BIRTH CONTROL/HORMONES: NORMAL
LAB AP CASE REPORT: NORMAL
LAB AP CERVICAL APPEARANCE: NORMAL
LAB AP MALIGNANT?: NORMAL
LAB AP PATIENT STATUS: NORMAL
LAB AP PREVIOUS ABNL: NORMAL
LAB AP PREVIOUS NORMAL: 2014
LAST MENS PERIOD: NORMAL
SPECIMEN ADEQUACY:: NORMAL

## 2018-12-26 NOTE — TELEPHONE ENCOUNTER
Per Dr. Ramos, colon prep is needed. However, I need to know where the pt is going for her colonoscopy so I can teach her the correct prep.    Routed to referrals. /JESUS Moraes

## 2018-12-26 NOTE — TELEPHONE ENCOUNTER
Attempted to reach patient 3 times regarding scheduling and letter mailed asking that she contact us to schedule.

## 2018-12-31 ENCOUNTER — OFFICE VISIT (OUTPATIENT)
Dept: FAMILY MEDICINE | Facility: CLINIC | Age: 53
End: 2018-12-31
Payer: MEDICAID

## 2018-12-31 VITALS
TEMPERATURE: 98.2 F | OXYGEN SATURATION: 97 % | SYSTOLIC BLOOD PRESSURE: 105 MMHG | DIASTOLIC BLOOD PRESSURE: 70 MMHG | WEIGHT: 192 LBS | HEIGHT: 63 IN | BODY MASS INDEX: 34.02 KG/M2 | HEART RATE: 84 BPM | RESPIRATION RATE: 20 BRPM

## 2018-12-31 DIAGNOSIS — G89.29 CHRONIC PAIN OF BOTH KNEES: ICD-10-CM

## 2018-12-31 DIAGNOSIS — K08.89 TOOTHACHE: ICD-10-CM

## 2018-12-31 DIAGNOSIS — G47.00 PERSISTENT INSOMNIA: Primary | ICD-10-CM

## 2018-12-31 DIAGNOSIS — M25.561 CHRONIC PAIN OF BOTH KNEES: ICD-10-CM

## 2018-12-31 DIAGNOSIS — M25.562 CHRONIC PAIN OF BOTH KNEES: ICD-10-CM

## 2018-12-31 RX ORDER — IBUPROFEN 600 MG/1
600 TABLET, FILM COATED ORAL EVERY 6 HOURS PRN
Qty: 60 TABLET | Refills: 0 | Status: SHIPPED | OUTPATIENT
Start: 2018-12-31 | End: 2019-12-31

## 2018-12-31 ASSESSMENT — MIFFLIN-ST. JEOR: SCORE: 1450.04

## 2018-12-31 NOTE — PROGRESS NOTES
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    Sleep  Has had sleep issues since her last chemo around 10 years ago. No issues falling asleep but had issues staying asleep. She is able to fall asleep but feels like its disrupted. She usually goes to bed at 11 and wakes up at 7, and about 5 times through this all. Has tried trazodone, melatonin in the past without much help. Feels tired in the morning. Has had some day time sleepiness. Doesn't take naps during the dayl. Has a comfortable bed and sleeps in her own bedroom.     Bilateral knee pain  Has bilateral knee pain 2/2 hx of DJD and has been getting cortisone shots for this in the past. She has found that this has helped in the past. Knees pop. Better with Epson salt, worse with cold weather. No numbness or tingling down her legs. Patient does report that she was told that she might need to have b/l knee replacements but she has not pursued this lately.     Tooth ache  Left sided tooth ache and has been seen by a dentist for this and they attempted extraction however this was unsuccessful. She will be seeing an oral surgeon for this.     Patient speaks English, so an  was not used.    Medications and problem list have been reviewed and updated.         REVIEW OF SYSTEMS   General: No fevers, chills  Head: No headache, dizziness  Neck: No swallowing problems   Resp: No cough.  "No congestion, coryza  GI: No constipation, diarrhea, no nausea or vomiting  Skin: No rash        OBJECTIVE     Vitals:    12/31/18 1337   BP: 105/70   Pulse: 84   Resp: 20   Temp: 98.2  F (36.8  C)   TempSrc: Oral   SpO2: 97%   Weight: 87.1 kg (192 lb)   Height: 1.6 m (5' 3\")     Body mass index is 34.01 kg/m .  Gen: In NAD, good color, appears well hydrated.  HEENT: No Scleral icterus or conjunctival injection  Neck: Supple without lymphadenopathy  CV:  RRR  - no murmurs, age appropriate rate  Pulm:  CTAB, no wheezes/rales/rhonchi, good air entry   Abdomen: soft, nontender, no masses, no rebound, BS intact throughout  Skin: No rashes or lesions noted.  Extremities: Notable for bilateral knee pain, tender to palpation bilaterally with palpation, popping sound with ROM testing. Good ROM otherwise, strength 5/5 bilaterally in LE, no numbness    ASSESSMENT AND PLAN     Martha was seen today for recheck, mouth/lip problem and sleep problem.    Diagnoses and all orders for this visit:    Persistent insomnia  Patient coming in today with a 10 year history of insomnia that started after her chemotherapy bout. No trouble falling asleep but continues to have multiple episodes of waking up in between the night. Has tried Ambien, Trazodone and melatonin in the past without much improvement. Concern is that this could be related to sleep apnea however pt denies any hx of snoring or apneic episodes. Will have her be evaluated at the sleep center.   -     SLEEP EVALUATION & MANAGEMENT REFERRAL - ADULT -[facility] [place] - [phone]; Future    Chronic pain of both knees  Has had longstanding hx of bilateral knee pain 2/2 DJD, has benefited from cortisone shots in the past and is interested in doing this again. No red flags at this time. She will schedule an appointment to get this done in about a weeks time. She would also like assistance with weight loss and would like a referral to the weight " clinic for this. Referral placed.   -     BARIATRIC ADULT REFERRAL; Future  -     ibuprofen (ADVIL/MOTRIN) 600 MG tablet; Take 1 tablet (600 mg) by mouth every 6 hours as needed for moderate pain    Toothache  - Ibuprofen as above.     Options for treatment and/or follow-up care were reviewed with the patient who was engaged and actively involved in the decision making process and verbalized understanding of the options discussed and was satisfied with the final plan. Risks and benefits of plan were carefully reviewed.     I, Ferdinand Ramos, have discussed patient findings with attending physician  Arnie Watt who was agreeable with plan.     Ferdinand Ramos, PGY2

## 2018-12-31 NOTE — PATIENT INSTRUCTIONS
Thank you for coming to [facility] for your care. It was a pleasure to take care of you!    - Great job on taking good care of your health, KEEP IT UP!  - Take Ibuprofen as needed for your tooth ache and also for your knee pain as needed.   - Referral placed to the Sleep center   - Referral placed for the weight loss clinic.   - We will see you back to get your knees injected  - Return to clinic in one week for follow up and for the knee injection.     Ferdinand Ramos MD    SLEEP EVALUATION & MANAGEMENT REFERRAL - ADULT -[facility] [place] - [phone]  [facility]  Ear Nose & Throat  Sleep Center  Phone: [phone]  Fax: [phone]    [facility]  [address] [place] [address], [address]  [place] [address]    Appointment:  Monday January 21, 2019  Arrival Time:  10:30 am  Provider:  Dr. Pires    A new patient packet will be mailed to your home. Please complete this and bring it in along with a copy of your insurance card and photo ID    If you cannot make this appointment please call [phone] to reschedule    January 2, 2019 at 9:27 am Referral, demographics, office visit and medication list faxed to [phone]. Jahaira Maharaj CMA    BARIATRIC ADULT REFERRAL  Patient to contact [facility] at [phone] to schedule appointment.    GENERAL SURG ADULT REFERRAL  [facility]  Phone: [phone]  Fax: [phone]  December 31, 2018 at 2:35 pm Argenis surgery scheduler - Left a message on recorder to call patient back to schedule. Ok to relay message. Faxed referral, office note and demographics to [phone].    COLORECTAL SURGERY REFERRAL   December 31, 2018 at 2:51 pm [employer] [facility]ician Referral Form successfully completed - they will contact patient/family to schedule.     December 31, 2018 at 2:55 pm relayed details of Sleep Study Evaluation appointment to Martha, explained that in regards to her Bariatric Referral is  for weight loss and the process preferred by the [facility] is to have Martha personally call [facility] to schedule her appointment. I explained that she will receive a call back from  Argenis with [facility] who will assist with setting up her Chemo port removal and to expect a call by the end of day on Wednesday January 2, 2019. For her colonoscopy I explained she would be contacted to review prep and to schedule her appointment with [facility] (per referral) and she should hear from their scheduling department by the end of the day on Thursday January 3, 2019. I did give Martha my direct number and encouraged her to call me if she has any problems, questions or concerns regarding her referrals. At this time Martha has no additional questions or concerns. Jahaira Maharaj, CMA

## 2018-12-31 NOTE — PROGRESS NOTES
Preceptor Attestation:   Patient seen, evaluated and discussed with the resident. I have verified the content of the note, which accurately reflects my assessment of the patient and the plan of care.   Supervising Physician:  Arnie Watt MD

## 2019-01-04 NOTE — TELEPHONE ENCOUNTER
Purchase these items at least 2 days before your colonoscopy.                           Miralax (polyethylene glycol)                  Dulcolax (bisacodyl)  8.3 oz powder (generic is OK)          5 mg tablets (generic is OK)                                  Gatorade                                                    Simethicone  64 oz (two 32 oz) - NO red or purple          80 mg      Day Before  Only take a clear liquid diet.  Noon: Take 2 tablets of dulcolax.    Between 4 and 6 PM: In a large container/pitcher, mix entire bottle (8.3 oz) of Miralax together with both bottles (64 oz) of Gatorade.  Drink 1 (8 oz) glass every 15 minutes until it's gone.  This will take about 2 hours to complete.    Right before you go to bed, take the simethicone.        Day Of Exam    At midnight (12:00 am) nothing to eat or drink after this point.  Nothing to eat or drink until after your procedure is complete. DO NOT EAT BREAKFAST!!! Take your blood pressure medications around 6:00 a.m. with sips of water.    3 hours before exam: Stop drinking any liquids.  No eating food.  No gum, tobacco, or hard candy.         [facility].      Will review Colonoscopy education (see above) once patient returns call.    TIM Pizano RN

## 2019-01-07 NOTE — TELEPHONE ENCOUNTER
Unable to contact patient to review colonoscopy prep.    Patient is scheduled to come in 2/11/19 for office visit.    Routed to Dr. Joe/TIM Pizano RN

## 2019-01-28 ENCOUNTER — DOCUMENTATION ONLY (OUTPATIENT)
Dept: CARE COORDINATION | Facility: CLINIC | Age: 54
End: 2019-01-28

## 2019-06-05 ENCOUNTER — OFFICE VISIT (OUTPATIENT)
Dept: FAMILY MEDICINE | Facility: CLINIC | Age: 54
End: 2019-06-05
Payer: COMMERCIAL

## 2019-06-05 VITALS
SYSTOLIC BLOOD PRESSURE: 104 MMHG | OXYGEN SATURATION: 97 % | HEART RATE: 80 BPM | WEIGHT: 199 LBS | DIASTOLIC BLOOD PRESSURE: 70 MMHG | RESPIRATION RATE: 18 BRPM | TEMPERATURE: 98.1 F | HEIGHT: 63 IN | BODY MASS INDEX: 35.26 KG/M2

## 2019-06-05 DIAGNOSIS — M54.59 ACUTE MECHANICAL LOW BACK PAIN WITH DURATION OF LESS THAN SIX WEEKS: Primary | ICD-10-CM

## 2019-06-05 RX ORDER — CYCLOBENZAPRINE HCL 5 MG
5 TABLET ORAL
Qty: 15 TABLET | Refills: 0 | Status: SHIPPED | OUTPATIENT
Start: 2019-06-05 | End: 2021-03-25

## 2019-06-05 RX ORDER — ACETAMINOPHEN 325 MG/1
325-650 TABLET ORAL EVERY 6 HOURS PRN
Qty: 60 TABLET | Refills: 3 | Status: SHIPPED | OUTPATIENT
Start: 2019-06-05

## 2019-06-05 ASSESSMENT — ANXIETY QUESTIONNAIRES
IF YOU CHECKED OFF ANY PROBLEMS ON THIS QUESTIONNAIRE, HOW DIFFICULT HAVE THESE PROBLEMS MADE IT FOR YOU TO DO YOUR WORK, TAKE CARE OF THINGS AT HOME, OR GET ALONG WITH OTHER PEOPLE: NOT DIFFICULT AT ALL
6. BECOMING EASILY ANNOYED OR IRRITABLE: NOT AT ALL
3. WORRYING TOO MUCH ABOUT DIFFERENT THINGS: SEVERAL DAYS
1. FEELING NERVOUS, ANXIOUS, OR ON EDGE: NOT AT ALL
2. NOT BEING ABLE TO STOP OR CONTROL WORRYING: NOT AT ALL
7. FEELING AFRAID AS IF SOMETHING AWFUL MIGHT HAPPEN: SEVERAL DAYS
5. BEING SO RESTLESS THAT IT IS HARD TO SIT STILL: NOT AT ALL
GAD7 TOTAL SCORE: 2

## 2019-06-05 ASSESSMENT — PAIN SCALES - GENERAL: PAINLEVEL: WORST PAIN (10)

## 2019-06-05 ASSESSMENT — PATIENT HEALTH QUESTIONNAIRE - PHQ9
SUM OF ALL RESPONSES TO PHQ QUESTIONS 1-9: 3
5. POOR APPETITE OR OVEREATING: NOT AT ALL

## 2019-06-05 ASSESSMENT — MIFFLIN-ST. JEOR: SCORE: 1481.79

## 2019-06-05 NOTE — LETTER
[address] [address]  Tel: [phone]  2019    Martha Caputo  [address]  [place] [address]  [phone] (home)     : 1965        To Whom it May Concern:    Martha Caputo was seen in at [facility] today for mechanical back pain. Due to her injury, she should avoid lifting movements for the next 2 weeks. 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      Please contact me for questions or concerns.    Sincerely,      Dontrell Daniels MD  2019

## 2019-06-05 NOTE — PROGRESS NOTES
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    The patient was doing laundry 3 days ago when she experienced sudden onset pain in the middle of her back.  The patient had picked up a heavy laundry basket immediately prior to the onset of pain.  She describes this as a pulling sensation.  Over the past 3 days she has had worsening left-sided back pain just beneath the scapula.  She describes the pain as spasm and gnawing sensation.      The patient has tried ibuprofen every 4-6 hours for the pain, but this has not been effective.  Cold showers seem to provide some temporary relief.  Lifting, lying down, and driving seem to make the pain worse.    She has no neck pain or low back pain.  No numbness, weakness, tingling, or tremors in her upper or lower extremities.  No chest pain or shortness of breath.  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  No other muscle or joint pain.  The pain does interfere with her ability to sleep at night.    Past Medical History, Past Surgical History, Medications, Allergies, and Family History were reviewed and updated as needed.        REVIEW OF SYSTEMS     CONSTITUTIONAL: No fever or chills.   HEENT: See above. No  "headache. No recent changes in vision.  RESPIRATORY: See above. No cough, wheezes.  CARDIOVASCULAR: See above.   GASTROINTESTINAL: No nausea, vomiting, or abdominal pain.  MUSCULOSKELETAL: See above.  NEUROLOGIC: See above.   PSYCHIATRIC: See above. No depressed mood or excessively elevated mood.         OBJECTIVE     Vitals:    06/05/19 0838   BP: 104/70   BP Location: Right arm   Patient Position: Sitting   Cuff Size: Adult Large   Pulse: 80   Resp: 18   Temp: 98.1  F (36.7  C)   TempSrc: Oral   SpO2: 97%   Weight: 90.3 kg (199 lb)   Height: 1.6 m (5' 3\")     Body mass index is 35.25 kg/m .    Physical Exam:  GENERAL: No acute distress. Appears relatively comfortable.  HEENT: Head: Normocephalic and atraumatic; no dysmorphic features. Eyes: Eye lids and lashes normal; pupils equal, round and reactive to light; extra ocular eye movements intact in all directions; no scleral icterus or conjunctival injection.   NECK: Supple and symmetric. Trachea midline. No anterior or posterior cervical lymphadenopathy, no supraclavicular lymphadenopathy. Skin normal.  LUNGS: No increased work of breathing; good air movement throughout all lung fields; breath sounds clear to auscultation bilaterally throughout all lung fields with no crackles or wheezing.  CARDIOVASCULAR: Regular rate and rhythm; normal S1 and S2; no S3 or S4; no murmur, rub or click.   ABDOMEN: Non-distended. Soft and non-tender in all quadrants; no masses or hepatosplenomegally.  BACK: Symmetric, no curvature. Cervical, thoracic and lumbar spinous processes are non-tender to palpation; paraspinous muscles in the thoracic region on the left are moderately tender to palpation just caudal to the inferior angle of the scapula; paraspinous muscle on the right are non-tender to palpation. No costal vertebral tenderness.  MUSCULOSKELETAL: No gross deformity, erythema, warmth or effusion of the joints. Full range of motion throughout the shoulders, elbows, wrists, hips, " knees and ankles. Tone is normal. Cervical, thoracic and lumbar spinous processes are non-tender to palpation; paraspinous muscles in the thoracic region on the left are moderately tender to palpation just caudal to the inferior angle of the scapula; paraspinous muscle on the right are non-tender to palpation. No costal vertebral tenderness.  NEUROLOGIC: Motor strength is 5/5 throughout the upper and lower extremities bilaterally. Patellar and achilles reflexes 2+ and symmetric. Gait is normal.  PSYCH: Normal affect, mood, orientation, memory and insight.    LABORATORY:  No results found for this or any previous visit (from the past 24 hour(s)).      ASSESSMENT AND PLAN     1. Acute mechanical low back pain with duration of less than six weeks  Who presents with 3-day history of thoracic back pain after lifting up a heavy laundry basket at home.  Experienced sudden onset of pain after picking up the laundry basket.  Currently experiencing gnawing and spasm-like sensation of the left paraspinous muscles just caudal to the inferior angle of the left scapula.  Physical exam significant for moderate tenderness to palpation over the paraspinous muscles of the left thoracic region.  Based on history and exam, suspect mechanical back pain secondary to muscle strain versus muscle irritation of possibly the erector spinae, latissimus dorsi, trapezius, or rhomboid muscle. Instructed patient to use acetaminophen and ibuprofen in alternative fashion, as well as continue to pursue normal daily activities and incorporate back range of motion and strengthening exercises to daily routine until back pain resolves. Encouraged use of heat/cold therapy as needed to help with symptoms.    - acetaminophen (TYLENOL) 325 MG tablet; Take 1-2 tablets (325-650 mg) by mouth every 6 hours as needed for mild pain  Dispense: 60 tablet; Refill: 3  - cyclobenzaprine (FLEXERIL) 5 MG tablet; Take 1 tablet (5 mg) by mouth nightly as needed for muscle  spasms  Dispense: 15 tablet; Refill: 0      Return to clinic as needed for follow-up of mechanical back pain.    Patient was discussed with attending physician, Dr. Bejarano, who agrees with the assessment and plan.    Dontrell Daniels, PGY-1  [facility]  6/5/2019

## 2019-06-05 NOTE — PATIENT INSTRUCTIONS
Plan:  1. Alternate using Tylenol and ibuprofen for pain control.  2. You can take Flexiril 1 pill at bedtime as needed to help control pain overnight.  3. Continue normal daily activities.  4. Try to do range of motion stretches for the back (flex and extend at the waist, side to side twisting of the waist, ect)    Please schedule a clinic appointment in 2 weeks for follow-up of back pain if worsens or does not improve.      [facility]  Phone: [phone]  Address: [address] [place] [address]

## 2019-06-05 NOTE — PROGRESS NOTES
Preceptor Attestation:   Patient seen, evaluated and discussed with the resident. I have verified the content of the note, which accurately reflects my assessment of the patient and the plan of care.   Supervising Physician:  Claudia Bejarano MD

## 2019-06-06 ASSESSMENT — ANXIETY QUESTIONNAIRES: GAD7 TOTAL SCORE: 2

## 2020-02-20 ENCOUNTER — OFFICE VISIT (OUTPATIENT)
Dept: FAMILY MEDICINE | Facility: CLINIC | Age: 55
End: 2020-02-20
Payer: COMMERCIAL

## 2020-02-20 VITALS
BODY MASS INDEX: 33.16 KG/M2 | HEART RATE: 68 BPM | DIASTOLIC BLOOD PRESSURE: 78 MMHG | RESPIRATION RATE: 16 BRPM | HEIGHT: 64 IN | WEIGHT: 194.2 LBS | TEMPERATURE: 98 F | OXYGEN SATURATION: 97 % | SYSTOLIC BLOOD PRESSURE: 115 MMHG

## 2020-02-20 DIAGNOSIS — N63.0 LUMP OR MASS IN BREAST: Primary | ICD-10-CM

## 2020-02-20 DIAGNOSIS — N64.4 BREAST PAIN, RIGHT: ICD-10-CM

## 2020-02-20 ASSESSMENT — MIFFLIN-ST. JEOR: SCORE: 1475.51

## 2020-02-20 NOTE — PROGRESS NOTES
[facility]         SUBJECTIVE       Martha Caputo is a 54 year old female with a history of breast cancer presenting to clinic today with a chief complaint of new lump on her left breast.  Patient notes that she has a history of recurrent breast cancer in the past.  She is had a lumpectomy on the left and a mastectomy on the right. She has undergone chemo/radiation in the past. Last had treatment approximately 10 years ago. She has been getting mammograms every 6 months.  Over the last 3 months she has noticed a new painful lump on the side of her left breast.  She has tenderness in her axilla region.  She denies any skin changes or rashes over the area.  No numbness or tingling.  She does have significant scar tissue.  In the same timeframe patient has started to notice right breast discomfort over the musculature that she describes as a spasm. She denies any skin changes or rashes over the area. Denies any recent unexpected weight loss, fevers, chills, nighttime sweats, dizziness, lightheadedness, headaches, nipple discharge, palpitations, nausea or vomiting.      PMH, Medications and Allergies were reviewed and updated as needed.    ROS:  As noted above otherwise negative.    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)     Breast cancer screening     Breast pain, left     DJD (degenerative joint disease) of knee     Personal history of malignant neoplasm of breast     Primary osteoarthritis of both knees     Major depressive disorder, recurrent episode, moderate (H)     Atypical depressive disease     Closed fracture of shaft of humerus     Polysubstance abuse (H)     Bilateral knee pain       Current Outpatient Medications   Medication Sig Dispense Refill     acetaminophen (TYLENOL) 325 MG tablet Take 1-2 tablets (325-650 mg) by mouth every 6 hours as needed for mild pain (Patient not taking: Reported on 2/20/2020) 60 tablet 3      "cyclobenzaprine (FLEXERIL) 5 MG tablet Take 1 tablet (5 mg) by mouth nightly as needed for muscle spasms (Patient not taking: Reported on 2/20/2020) 15 tablet 0            OBJECTIVE:       Vitals:   Vitals:    02/20/20 0932   BP: 115/78   BP Location: Right arm   Pulse: 68   Resp: 16   Temp: 98  F (36.7  C)   TempSrc: Oral   SpO2: 97%   Weight: 88.1 kg (194 lb 3.2 oz)   Height: 1.633 m (5' 4.29\")     BMI: Body mass index is 33.03 kg/m .    Gen:  Well nourished and in no acute distress  HEENT: Extraocular movement intact.  Neck: supple without lymphadenopathy. No supraclavicular lymphadenopathy noted.  CV:  RRR  - no murmurs noted   Pulm:  CTAB, no wheezes or crackles noted, good air entry   Breast: 1 cm x 1 cm palpable lump approximately 2 inches laterally from patient's nipple line at approximately the 3 o'clock position.  She does have pain into her axilla.  No palpable lymph nodes in that area.  No overlying orange peel discoloration or dimpling.  Right chest has scarring consistent with history of right-sided mastectomy.  Pain to palpation over mid chest.  No palpable lumps.  No pain in right axilla.  ABD: soft, nontender, no masses, no rebound, BS intact throughot  Extrem: no cyanosis, edema or clubbing  Psych: Euthymic           ASSESSMENT and PLAN:     Patient already had an appointment scheduled for [facility] on 2/24/2020.  Orders for diagnostic mammogram and bilateral ultrasound will be faxed over.  Patient does need bilateral breast ultrasound given lump on the left and new pain on the right in the setting of a right mastectomy.  I did recommend that the patient follow-up back in clinic following these exams so that we could help with pain control options and to discuss results.    Martha was seen today for other.    Diagnoses and all orders for this visit:    Lump or mass in breast  -     [facility]  DIGITAL BILATERAL; Future  -     US Breast Bilateral; Future    Breast pain, right  -     [facility] " DIAGNOSTIC  DIGITAL BILATERAL; Future  -     US Breast Bilateral; Future        Return to clinic in 2 weeks for follow up. Return sooner if develops new or worsening symptoms.    Options for treatment and/or follow-up care were reviewed with the patient was actively involved in the decision making process. Patient verbalized understanding and was in agreement with the plan.    The patient was seen by and discussed with MD Lisa Lao DO PGY 3  [facility]  [phone]

## 2020-02-20 NOTE — PROGRESS NOTES
Preceptor Attestation:   Patient seen, evaluated and discussed with the resident. I have verified the content of the note, which accurately reflects my assessment of the patient and the plan of care.   Supervising Physician:  Joby Means MD.

## 2020-02-20 NOTE — PATIENT INSTRUCTIONS
1. Lump or mass in breast  - [facility] DIAGNOSTIC  DIGITAL BILATERAL; Future  - US Breast Bilateral; Future    2. Breast pain, right  - [facility] DIAGNOSTIC  DIGITAL BILATERAL; Future  - US Breast Bilateral; Future    02/20/20    [facility]   [address]  [address]  [place] [address]    Phone: [phone]  Fax: [phone]    Demographics and orders faxed to [phone].    Liseth Martino

## 2020-03-04 DIAGNOSIS — N63.0 LUMP OR MASS IN BREAST: ICD-10-CM

## 2020-03-04 DIAGNOSIS — N64.4 BREAST PAIN, RIGHT: ICD-10-CM

## 2020-03-04 LAB — MAMMOGRAM: NORMAL

## 2020-03-22 ENCOUNTER — DOCUMENTATION ONLY (OUTPATIENT)
Dept: FAMILY MEDICINE | Facility: CLINIC | Age: 55
End: 2020-03-22

## 2020-04-20 ENCOUNTER — VIRTUAL VISIT (OUTPATIENT)
Dept: FAMILY MEDICINE | Facility: CLINIC | Age: 55
End: 2020-04-20
Payer: COMMERCIAL

## 2020-04-20 VITALS — WEIGHT: 196 LBS | BODY MASS INDEX: 34.73 KG/M2 | HEIGHT: 63 IN

## 2020-04-20 DIAGNOSIS — M25.571 PAIN IN JOINT INVOLVING ANKLE AND FOOT, RIGHT: Primary | ICD-10-CM

## 2020-04-20 ASSESSMENT — MIFFLIN-ST. JEOR: SCORE: 1463.18

## 2020-04-20 NOTE — PROGRESS NOTES
"Family Medicine Video Visit Note  Martha is being evaluated via a billable video visit.  This was our original plan, but the video function did not work and so this was a telephone only visit.         Video Visit Consent     Patient was verbally read the following and verbal consent was obtained.  \"This video visit will be conducted via a call between you and your physician/provider. We have found that certain health care needs can be provided without the need for an in-person physical exam.  This service lets us provide the care you need with a video conversation.  If a prescription is necessary we can send it directly to your pharmacy.  If lab work is needed we can place an order for that and you can then stop by our lab to have the test done at a later time.    If during the course of the call the physician/provider feels a video visit is not appropriate, you will not be charged for this service.\"     (Name person giving consent:  Patient   Date verbal consent given:  4/20/2020  Time verbal consent given:  3:26 PM)    Patient would like the video invitation sent by: Text to cell phone: [phone]    Chief Complaint   Patient presents with     Swelling     fell on Saturday and sprain the right ankle, it's swelling and very painful per patient.     Pain     have been having sharp pain on the feet per patient.     Medication Reconciliation     reviewed.           HPI     Video Start Time: 3:56 PM    Martha presents to clinic today for the following health issues:    Current Outpatient Medications   Medication Sig Dispense Refill     acetaminophen (TYLENOL) 325 MG tablet Take 1-2 tablets (325-650 mg) by mouth every 6 hours as needed for mild pain (Patient not taking: Reported on 2/20/2020) 60 tablet 3     cyclobenzaprine (FLEXERIL) 5 MG tablet Take 1 tablet (5 mg) by mouth nightly as needed for muscle spasms (Patient not taking: Reported on 2/20/2020) 15 tablet 0     Allergies   Allergen Reactions     Morphine Hives "     -Patient reportedly had a fall on Saturday and sprained her right ankle; has been having increasing pain in that ankle.  Notes that she was barbecuing on Saturday and was going to get the grill and slipped and fell, landing awkwardly on her right ankle.  Noticed pain, swelling, and difficulty bearing weight immediately.  Has been using crutches at her daughter had and has not been putting much weight on it at all.  It feels slightly better today than yesterday, but still painful.  Patient works as a [email or web address] and wanted to get it checked out before going back to work.  She states she does not take medications often, does not go to the doctor frequently, but knows that something is wrong and wants to get it checked out since she knows she needs to.  She notes tenderness to the lateral malleolus region of the right ankle, with associated swelling in the lateral edge of the right foot on the dorsum of the area overlying the fourth and fifth metatarsal heads.  She has some tenderness over the dorsum of the right foot with palpation as well.  Has never injured that ankle before.  Did not hit her head.  No loss of consciousness with the fall.         Review of Systems:     Pertinent ROS noted in HPI.        Assessment and Plan   This is a 54-year-old female with history of breast cancer status post treatment calling in today after a fall on Saturday and suffering right ankle pain with almost immediate swelling.  She has had difficulty bearing weight and has been using crutches to ambulate.  She describes tenderness to palpation over the lateral malleolus and over the forefoot in the area of the navicular bone.  Using the [place] ankle rules, we cannot rule out a fracture, and after shared decision making with the patient, I think it is quite reasonable to have her come in to get a x-ray of her right foot/ankle to rule out fracture.  She will then have a visit with me to discuss therapy moving forward for ankle sprain  versus bony fracture.  She voiced understanding of this.  We will get her scheduled tomorrow morning.    Telephone-Visit Details    Type of service:  16 minute phone visit.    Telephone End Time (time video stopped): 4:12 PM    Originating Location (pt. Location): Home    Distant Location (provider location):  [facility]     The patient was discussed and evaluated with Dr. Ryan MD.    Edu Daugherty, PGY-3  [facility]  Pager:  [phone]

## 2020-04-20 NOTE — PROGRESS NOTES
Preceptor Attestation:    I talked to the patient on the phone and discussed the patient with the resident. I have verified the content of the note, which accurately reflects my assessment of the patient and the plan of care.   Supervising Physician:  Andre Davis MD.

## 2020-04-21 ENCOUNTER — OFFICE VISIT (OUTPATIENT)
Dept: FAMILY MEDICINE | Facility: CLINIC | Age: 55
End: 2020-04-21
Payer: COMMERCIAL

## 2020-04-21 VITALS
SYSTOLIC BLOOD PRESSURE: 113 MMHG | OXYGEN SATURATION: 97 % | DIASTOLIC BLOOD PRESSURE: 76 MMHG | HEART RATE: 79 BPM | TEMPERATURE: 98.5 F

## 2020-04-21 DIAGNOSIS — S82.891A AVULSION FRACTURE OF ANKLE, RIGHT, CLOSED, INITIAL ENCOUNTER: Primary | ICD-10-CM

## 2020-04-21 DIAGNOSIS — S99.911A ANKLE INJURY, RIGHT, INITIAL ENCOUNTER: Primary | ICD-10-CM

## 2020-04-21 DIAGNOSIS — M79.671 RIGHT FOOT PAIN: Primary | ICD-10-CM

## 2020-04-21 NOTE — PROGRESS NOTES
Preceptor Attestation:   Patient seen, evaluated and discussed with the resident. I personally reviewed the imaging and agree with the interpretation documented by the resident. I have verified the content of the note, which accurately reflects my assessment of the patient and the plan of care.   Supervising Physician:  Duran Diaz MD.

## 2020-04-21 NOTE — PROGRESS NOTES
S: Martha Caputo is a 54 year old female with a PMH of breast cancer, DJD of knee, MDD, and prior history of polysubstance abuse presenting to clinic today for recheck of right ankle.    -Please see my phone visit from yesterday; the patient had an injury on Saturday (3 days ago) where she slipped and landed awkwardly on her right leg/ankle.  She has had pain over the lateral malleolus and just distally over the fifth metatarsal since that time.  She has not been able to bear much weight on the foot.  Today compared to yesterday notes that she continues to have about the same amount of pain and swelling.  Has not really been putting any weight on it.  Has been trying to elevate it.  She is concerned about a fracture in the foot.    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)     Breast cancer screening     Breast pain, left     DJD (degenerative joint disease) of knee     Personal history of malignant neoplasm of breast     Primary osteoarthritis of both knees     Major depressive disorder, recurrent episode, moderate (H)     Atypical depressive disease     Closed fracture of shaft of humerus     Polysubstance abuse (H)     Bilateral knee pain     Current Outpatient Medications   Medication Sig Dispense Refill     acetaminophen (TYLENOL) 325 MG tablet Take 1-2 tablets (325-650 mg) by mouth every 6 hours as needed for mild pain (Patient not taking: Reported on 2/20/2020) 60 tablet 3     cyclobenzaprine (FLEXERIL) 5 MG tablet Take 1 tablet (5 mg) by mouth nightly as needed for muscle spasms (Patient not taking: Reported on 2/20/2020) 15 tablet 0     O: /76 (BP Location: Right arm, Patient Position: Sitting, Cuff Size: Adult Regular)   Pulse 79   Temp 98.5  F (36.9  C) (Oral)   SpO2 97%    Constitutional:  Well nourished and in no acute distress.  Eyes: EOMI.  No scleral icterus noted.   Head: Atraumatic, normocephalic.  Musc/Skeletal: There is pinpoint  tenderness to palpation over the right proximal fifth metatarsal head.  No tenderness to palpation over the medial malleolus and the right ankle.  Tenderness to palpation over the lateral malleolus.  Integument: Mild ecchymosis over the right forefoot.  Extrem: No lower extremity edema.  The calves are nontender bilaterally.  Neuro: Sensation to light touch is intact in both the lower extremities.  Psych: Euthymic.      Assessment and Plan:  Martha was seen today for pain in the right foot.    Diagnoses and all orders for this visit:    Right foot pain  -This is a 54-year-old female with history of breast cancer, previously treated, as well as a closed fracture of the humerus back in 2013 coming in today for a fall that occurred 3 days ago, with subsequent pain over the lateral portion of the right forefoot and inability to bear weight.  I talked to her on the phone yesterday, using the [place] criteria, I was not able to safely rule out a bony fracture, and hence she came in for x-rays today.  X-ray of the ankle on the right initially.  Normal with no fracture appreciated.  However, she had significant pinpoint tenderness over the proximal portion of the right fifth metatarsal, so I did obtain an x-ray of her right forefoot, which does appear to show a small avulsion fracture of the right proximal tuberosity of the fifth metatarsal.  I discussed management options with her.  I think at this time I would like to wait for the formal read of the foot x-ray from radiology, but I put her in a compressive dressing with double layer Ace wrap and instructed her to continue using the crutches so that she is not weightbearing.  Pending formal radiology read, would then likely transition to a walking boot.  Encouraged ice massage and Tylenol/ibuprofen for pain relief for now.  I will call the patient to follow-up with her later this afternoon.  -     XR FOOT RT G/E 3 VW    RTC PRN.    The patient was discussed and evaluated with  Dr. Joe MD.    Edu Daugherty, PGY-3  [facility]  Pager:  [phone]

## 2020-04-22 DIAGNOSIS — S99.911A ANKLE INJURY, RIGHT, INITIAL ENCOUNTER: ICD-10-CM

## 2020-04-27 ENCOUNTER — TELEPHONE (OUTPATIENT)
Dept: FAMILY MEDICINE | Facility: CLINIC | Age: 55
End: 2020-04-27

## 2020-04-27 ENCOUNTER — VIRTUAL VISIT (OUTPATIENT)
Dept: FAMILY MEDICINE | Facility: CLINIC | Age: 55
End: 2020-04-27
Payer: COMMERCIAL

## 2020-04-27 DIAGNOSIS — S92.354D CLOSED NONDISPLACED FRACTURE OF FIFTH METATARSAL BONE OF RIGHT FOOT WITH ROUTINE HEALING, SUBSEQUENT ENCOUNTER: Primary | ICD-10-CM

## 2020-04-27 NOTE — PROGRESS NOTES
Preceptor Attestation:    I talked to the patient on the phone and discussed the patient with the resident. I have verified the content of the note, which accurately reflects my assessment of the patient and the plan of care.   Supervising Physician:  Arnie Watt MD.

## 2020-04-27 NOTE — TELEPHONE ENCOUNTER
[facility] phone call message- patient requesting results:    Test: Lab    Date of test:    04/21/2020    Additional Comments:    Pt's needing a call back from Dr. Daugherty with her results. She states he told her that he would call her back and she's not received a callback.     OK to leave a message on voice mail? Yes    Primary language: English      needed? No    Call taken on April 27, 2020 at 10:58 AM by Kenya Temple CMA

## 2020-04-27 NOTE — TELEPHONE ENCOUNTER
"Discussed with patient boot was ordered by this RN last week. Plan was for Dr. Daugherty to discuss specific plan with patient, so will route this to him.    Patient also reports she needs pain medication. She is unable to take Ibuprofen as it \"messes up her stomach\" and Tylenol is not helping.      Offered to call [facility] [employer] to see if boot was ready. They did not have demographic information so provided them with this. Patient requesting to  the boot, but I her [employer] their showroom is closed so they will have to deliver. She is requesting they call her before delivering. I will request this from them.     Patient states she is able to wait from a call back from Dr. Daugherty regarding her results, but her pain is so severe she would like to speak to some today about this. Suggested telephone appointment with a different provider which she was agreeable. Unable to transfer due to telephone network problems, so requested patient call back to schedule. She was agreeable to plan. ./LR  "

## 2020-04-27 NOTE — PROGRESS NOTES
"Family Medicine Telephone Visit Note         Telephone Visit Consent   Patient was verbally read the following and verbal consent was obtained.    \"This telephone visit will be conducted via a call between you and your physician/provider. We have found that certain health care needs can be provided without the need for a physical exam.  This service lets us provide the care you need with a short phone conversation.  If a prescription is necessary we can send it directly to your pharmacy.  If lab work is needed we can place an order for that and you can then stop by our lab to have the test done at a later time.    Telephone visits are billed at different rates depending on your insurance coverage. During this emergency period, for some insurers they may be billed the same as an in-person visit.  Please reach out to your insurance provider with any questions.    If during the course of the call the physician/provider feels a telephone visit is not appropriate, you will not be charged for this service.\"    Name person giving consent:  Patient   Date verbal consent given:  4/27/2020  Time verbal consent given:  2:32 PM    Chief Complaint   Patient presents with     RECHECK     Need stronger medication for her ankle pain          HPI   Patients name: Martha  Appointment start time:  14:50    Patient presents today via telephone visit with chief complaint of foot pain. Patient sustained a right ankle and foot injury on 4/18/2020. X-ray of the right foot and ankle on 4/21/2020 revealed transverse fracture through the base of the fifth metatarsal extending to the articular surface with 4 mm fracture diastases.    She initially was placed in a compressive dressing with ACE wrap and instructed to use crutches to maintain non-weightbearing on the right. She was ordered a walking boot but is still waiting to receive this.     The patient is in significant pain. She is using Tylenol every 4-6 hours without relief of pain. She is " unable to take ibuprofen as this causes stomach issues. She would like something to help relieve the pain.    Current Outpatient Medications   Medication Sig Dispense Refill     acetaminophen (TYLENOL) 325 MG tablet Take 1-2 tablets (325-650 mg) by mouth every 6 hours as needed for mild pain 60 tablet 3     cyclobenzaprine (FLEXERIL) 5 MG tablet Take 1 tablet (5 mg) by mouth nightly as needed for muscle spasms (Patient not taking: Reported on 2/20/2020) 15 tablet 0     order for DME Diagnosis: Avulsion fracture of ankle, right, closed, initial encounter.  ICD-10 code: S82.891A    Equipment being ordered: Short leg walking boot 1 Units 0     Allergies   Allergen Reactions     Morphine Hives            Review of Systems:     CONSTITUTIONAL: No fever or chills.  MUSCULOSKELETAL: See above.   NEUROLOGIC: No numbness, tingling, or weakness.         Physical Exam:     Exam:  Constitutional: alert, no distress and cooperative  Psychiatric: mentation appears normal, affect normal and judgment and insight intact        Assessment and Plan     1. Closed nondisplaced fracture of fifth metatarsal bone of right foot with routine healing, subsequent encounter  Presents with right foot pain. Sustained right ankle and foot injury on 4/18/2020. X-ray of the right foot and ankle on 4/21/2020 revealed transverse fracture through the base of the fifth metatarsal extending to the articular surface with 4 mm fracture diastases. Will direct patient to orthopedic surgery clinic today as these type of fractures are at risk for non-union and delayed healing. Patient instructed to go to [facility] today.  - ORTHOPEDICS ADULT REFERRAL; Future    After Visit Information:  Will print and mail AVS     Appointment end time: 15:05  This is a telephone visit that took 15 minutes.    Clinician location:  [facility]    Return to clinic in 4 weeks for follow-up of metatarsal fracture or sooner if develops new or  worsening symptoms.    Patient was discussed with attending physician, Dr. Jhon Watt MD, who agrees with the assessment and plan.    Dontrell Daniels, PGY-2  [facility]  4/15/2020

## 2020-04-30 NOTE — PATIENT INSTRUCTIONS
ORTHOPEDICS ADULT REFERRAL  [facility]  Phone: [phone]  Fax: [phone]    Online referral placed. They will contact patient to schedule.     Also faxed demographics, referral, office note and radiology reports.     Liseth Martino

## 2020-09-15 ENCOUNTER — VIRTUAL VISIT (OUTPATIENT)
Dept: FAMILY MEDICINE | Facility: CLINIC | Age: 55
End: 2020-09-15
Payer: COMMERCIAL

## 2020-09-15 ENCOUNTER — TELEPHONE (OUTPATIENT)
Dept: FAMILY MEDICINE | Facility: CLINIC | Age: 55
End: 2020-09-15

## 2020-09-15 VITALS — WEIGHT: 197 LBS | HEIGHT: 63 IN | BODY MASS INDEX: 34.91 KG/M2

## 2020-09-15 DIAGNOSIS — A08.4 VIRAL GASTROENTERITIS: Primary | ICD-10-CM

## 2020-09-15 RX ORDER — ONDANSETRON 4 MG/1
4 TABLET, ORALLY DISINTEGRATING ORAL EVERY 8 HOURS PRN
Qty: 12 TABLET | Refills: 0 | Status: SHIPPED | OUTPATIENT
Start: 2020-09-15 | End: 2021-03-25

## 2020-09-15 ASSESSMENT — MIFFLIN-ST. JEOR: SCORE: 1467.72

## 2020-09-15 NOTE — TELEPHONE ENCOUNTER
[facility] phone call message- general phone call:    Reason for call:she would like to talk to a nurse re she has been Vomiting and feeling nauseous fatigue. She wants to know if she should come in for a apt or would a telephone visit be ok?    Action desired: call back.    Return call needed: Yes    OK to leave a message on voice mail? Yes    Advised patient to response may take up to 2 business days: Yes    Primary language: English      needed? No    Call taken on September 15, 2020 at 10:21 AM by Delia Hapr

## 2020-09-15 NOTE — PROGRESS NOTES
Preceptor Attestation:  I talked to the patient on the phone. I discussed the patient with the resident. I have verified the content of the note, which accurately reflects my assessment of the patient and the plan of care.  Supervising Physician:  Sabina Gilbert MD.

## 2020-09-15 NOTE — TELEPHONE ENCOUNTER
"Patient reports she began feeling nauseous on Sunday, with emesis (water) \"a couple times\" and diarrhea (no blood or mucous). She has not vomited since, but continues having 3-5 episodes of diarrhea a day, non-bloody. She is feeling easily fatigued and intermittently diaphoretic. She denies URI symptoms, fever, dizziness, feeling like she is going to pass out. No abdominal pain other than cramping associated with diarrhea. No sick contacts, no one else sick in the home. She feels hungry, but when she eats she feels \"heavy.\" She is drinking a lot of water and resting.     Discussed with patient that that given she is tolerating PO, able to force fluids, does not feel dizzy or have a fever, and does not have abdominal pain I thought it was appropriate to start with a virtual visit. She is agreeable. Scheduled for telephone visit with Dr. Daniels this afternoon. Routed to Dr. Daniels. ./LR      "

## 2020-09-15 NOTE — PROGRESS NOTES
"Family Medicine Telephone Visit Note         Telephone Visit Consent   Patient was verbally read the following and verbal consent was obtained.    \"Telephone visits are billed at different rates depending on your insurance coverage. During this emergency period, for some insurers they may be billed the same as an in-person visit.  Please reach out to your insurance provider with any questions.  If during the course of the call the physician/provider feels a telephone visit is not appropriate, you will not be charged for this service.\"    Name person giving consent:  Patient   Date verbal consent given:  9/15/2020  Time verbal consent given:  2:43 PM    Chief Complaint   Patient presents with     Vomiting     since Sunday, unable to eat anything, It did get better yesterday     Nausea     still has the nausea- unable to eat anything     Fever     just on Sunday, better now     Chills     better now     Fatigue     Diarrhea     super soft / watery stools            HPI   Patients name: Martha  Appointment start time:  2:43 PM    Presents via telephone visit with chief complaint of nausea, vomiting and diarrhea.    Patient developed nausea 2 days ago. 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 She had significant nausea and had 4 episodes of vomiting. The vomit was clear in coloration. No blood or bilious appearance of the vomit. She also had 5 episodes of diarrhea that day. The stool was soft and runny and without blood or mucus. She felt chilled later that day and reports shaking chills. After the chills she had a fever.     The next day (yesterday) she continued to have nausea. She says this was \"worse than morning sickness.\" She had no further vomiting. She had about 5 more episodes of diarrhea. The fever and chills resolved.     Today the patient reports that she still has nausea but that this is improved. She bought bottled water, 7-Up and soup which she is planning on consuming but is afraid that she might vomit " "again. She is tolerating sips of liquids however. She also feels fatigued and exhausted.    No cough, wheezing or shortness of breath. No chest discomfort. No urinary frequency or urgency, and no pain or discomfort with urination. No flank pain. She did have abdominal cramping but this has now improved. No skin rash, muscle aches or body aches. No sore throat, runny nose, nasal congestion or headache.    Current Outpatient Medications   Medication Sig Dispense Refill     acetaminophen (TYLENOL) 325 MG tablet Take 1-2 tablets (325-650 mg) by mouth every 6 hours as needed for mild pain (Patient not taking: Reported on 9/15/2020) 60 tablet 3     cyclobenzaprine (FLEXERIL) 5 MG tablet Take 1 tablet (5 mg) by mouth nightly as needed for muscle spasms (Patient not taking: Reported on 2/20/2020) 15 tablet 0     order for DME Diagnosis: Avulsion fracture of ankle, right, closed, initial encounter.  ICD-10 code: S82.891A    Equipment being ordered: Short leg walking boot (Patient not taking: Reported on 9/15/2020) 1 Units 0     Allergies   Allergen Reactions     Morphine Hives          Review of Systems:     CONSTITUTIONAL: See above.   HEENT: See above.   RESPIRATORY: See above.   CARDIOVASCULAR: See above.   GASTROINTESTINAL: See above.  GENITOURINARY: See above.   MUSCULOSKELETAL: See above.          Physical Exam:     There were no vitals taken for this visit.  Estimated body mass index is 34.72 kg/m  as calculated from the following:    Height as of 4/20/20: 1.6 m (5' 3\").    Weight as of 4/20/20: 88.9 kg (196 lb).    Exam:  Constitutional: alert, no distress and cooperative  Psychiatric: mentation appears normal, affect normal and judgment and insight intact        Assessment and Plan     1. Viral gastroenteritis  Presents with 2 day history of nausea, emesis, diarrhea, fever and chills. Suspect viral gastroenteritis. Fever resolved and symptoms are improving overall. Tolerating sips of liquids, which lowers concern " for dehydration. Will provide Zofran for assistance controlling nausea. Encouraged oral consumption of fluids for rehydration. Encouraged BRAT diet for nutrient and electrolyte replacement until nausea is fully resolved. Encouraged patient to schedule follow-up appointment in 2-3 days if her symptoms worsen, she is unable to tolerate oral intake of fluids, has or if she has profound fatigue/lethargy or recurrent fevers.  - ondansetron (ZOFRAN-ODT) 4 MG ODT tab; Take 1 tablet (4 mg) by mouth every 8 hours as needed for nausea or vomiting  Dispense: 12 tablet; Refill: 0    Appointment end time: 2:56 PM  This is a telephone visit that took 13 minutes.    Clinician location:  [facility]    Return to clinic if develops new or worsening symptoms.    Patient was discussed with attending physician, Dr. Chelsea Gilbert MD, who agrees with the assessment and plan.    Dontrell Daniels, PGY-3  [facility]  09/15/20

## 2020-10-22 ENCOUNTER — VIRTUAL VISIT (OUTPATIENT)
Dept: FAMILY MEDICINE | Facility: OTHER | Age: 55
End: 2020-10-22

## 2020-10-22 NOTE — PROGRESS NOTES
"Date: 10/22/2020 15:17:46  Clinician: Ursula Ortiz  Clinician NPI: [id number]  Patient: Martha Caputo  Patient : 1965  Patient Address: [address] [place] [address], [place] [id number]  Patient Phone: [phone]  Visit Protocol: URI  Patient Summary:  Martha is a 55 year old ( : 1965 ) female who initiated a [facility] Visit for COVID-19 (Coronavirus) evaluation and screening. When asked the question \"Please sign me up to receive news, health information and promotions. \", Martha responded \"Yes\".    Martha states her symptoms started 1-2 days ago.   Her symptoms consist of a headache, a cough, chills, malaise, a sore throat, and diarrhea.   Symptom details     Cough: Martha coughs every 5-10 minutes and her cough is not more bothersome at night. Phlegm comes into her throat when she coughs. She does not believe her cough is caused by post-nasal drip. The color of the phlegm is white.     Sore throat: Martha reports having severe throat pain (7-9 on a 10 point pain scale), does not have exudate on her tonsils, and can swallow liquids. She is not sure if the lymph nodes in her neck are enlarged. A rash has not appeared on the skin since the sore throat started.     Headache: She states the headache is mild (1-3 on a 10 point pain scale).      Martha denies having ear pain, wheezing, fever, nasal congestion, anosmia, vomiting, rhinitis, nausea, facial pain or pressure, myalgias, teeth pain, and ageusia. She also denies taking antibiotic medication in the past month and having recent facial or sinus surgery in the past 60 days. She is not experiencing dyspnea.   Precipitating events  Martha is not sure if she has been exposed to someone with strep throat. She has recently been exposed to someone with influenza. Martha has not been in close contact with any high risk individuals.   Pertinent COVID-19 (Coronavirus) information  In the past 14 days, Martha has not worked in a congregate living setting.   She does not work or volunteer as " healthcare worker or a  and does not work or volunteer in a healthcare facility.   Martha also has not lived in a congregate living setting in the past 14 days. She does not live with a healthcare worker.   Martha has not had a close contact with a laboratory-confirmed COVID-19 patient within 14 days of symptom onset.   Since December 2019, Martha and has had upper respiratory infection (URI) or influenza-like illness. Has not been diagnosed with lab-confirmed COVID-19 test      Date(s) of previous URI or influenza-like illness (free-text): March 2019     Symptoms Martha experienced during previous URI or influenza-like illness as reported by the patient (free-text): Running nose double ear infection respiratory infection sore throat hard time breathing        Pertinent medical history  Martha does not get yeast infections when she takes antibiotics.   Martha needs a return to work/school note.   Weight: 196 lbs   Martha smokes or uses smokeless tobacco.   Weight: 196 lbs    MEDICATIONS: No current medications, ALLERGIES: Morpholine Analogues  Clinician Response:  Dear Martha,  Based on the information provided, you have a viral upper respiratory infection, otherwise known as a cold. Symptoms vary from person to person, but can include sneezing, coughing, a runny nose, sore throat, and headache and range from mild to severe.  Unfortunately, there are no medications that can cure a cold, so treatment is focused on controlling symptoms as much as possible. Most people gradually feel better until symptoms are gone in 1-2 weeks.  Medication information  Because you have a viral infection, antibiotics will not help you get better. Treating a viral infection with antibiotics could actually make you feel worse.  Unless you are allergic to the over-the-counter medication(s) below, I recommend using:     Dextromethorphan (Robitussin DM or store brand). This medication is a cough suppressant that works by decreasing the  feeling of needing to cough. Please follow the instructions on the package.   Over-the-counter medications do not require a prescription. Ask the pharmacist if you have any questions.  Self care  Steps you can take to be as comfortable as possible:     Rest.    Drink plenty of fluids.    Take a warm shower to loosen congestion    Use a cool-mist humidifier.    Use throat lozenges.    Suck on frozen items such as popsicles.    Drink hot tea with lemon and honey.    Gargle with warm salt water (1/4 teaspoon of salt per 8 ounce glass of water).    Take a spoonful of honey to reduce your cough.     Also, as your provider, I need you to know that becoming tobacco-free is the most important thing you can do to protect your current and future health.  When to seek care  Please be seen in a clinic or urgent care if any of the following occur:   New symptoms develop, or symptoms become worse   Call ahead before going to the clinic or urgent care.  Call 911 or go to the emergency room if you feel that your throat is closing off, you suddenly develop a rash, you are unable to swallow fluids, you are drooling, or you are having difficulty breathing.  Additional treatment plan   Your symptoms show that you may have coronavirus (COVID-19). This illness can cause fever, cough and trouble breathing. Many people get a mild case and get better on their own. Some people can get very sick.  Based on the symptoms you have shared, I would like you to be re-checked in 2 to 3 days. Please call your family clinic to set up a video or phone visit.  Will I be tested for COVID-19?  We would like to test you for this virus.   Please call [phone] to schedule your visit. Explain that you were referred by [facility] to have a COVID-19 test. Be ready to share your [facility] visit ID number.   The following will serve as your written order for this COVID Test, ordered by me, for the indication of suspected COVID [Z20.828]: The test will be ordered in  "Epic, our electronic health record, after you are scheduled. It will show as ordered and authorized by Bobby Temple MD.  Order: COVID-19 (Coronavirus) PCR for SYMPTOMATIC testing from [facility].  1.When it's time for your COVID test:   Stay at least 6 feet away from others. (If someone will drive you to your test, stay in the backseat, as far away from the  as you can.)   Cover your mouth and nose with a mask, tissue or washcloth.  Go straight to the testing site. Don't make any stops on the way there or back.      2.Starting now: Stay home and away from others (self-isolate) until:   You've had no fever---and no medicine that reduces fever---for one full day (24 hours). And...   Your other symptoms have gotten better. For example, your cough or breathing has improved. And...   At least 10 days have passed since your symptoms started.       During this time, don't leave the house except for testing or medical care.   Stay in your own room, even for meals. Use your own bathroom if you can.   Stay away from others in your home. No hugging, kissing or shaking hands. No visitors.  Don't go to work, school or anywhere else.    Clean \"high touch\" surfaces often (doorknobs, counters, handles, etc.). Use a household cleaning spray or wipes. You'll find a full list of  on the EPA website: www.epa.gov/pesticide-registration/list-n-disinfectants-use-against-sars-cov-2.   Cover your mouth and nose with a mask, tissue or washcloth to avoid spreading germs.  Wash your hands and face often. Use soap and water.  Caregivers in these groups are at risk for severe illness due to COVID-19:  o People 65 years and older  o People who live in a nursing home or long-term care facility  o People with chronic disease (lung, heart, cancer, diabetes, kidney, liver, immunologic)   o People who have a weakened immune system, including those who:   Are in cancer treatment  Take medicine that weakens the immune system, such as " corticosteroids  Had a bone marrow or organ transplant  Have an immune deficiency  Have poorly controlled HIV or AIDS  Are obese (body mass index of 40 or higher)  Smoke regularly   o Caregivers should wear gloves while washing dishes, handling laundry and cleaning bedrooms and bathrooms.  o Use caution when washing and drying laundry: Don't shake dirty laundry, and use the warmest water setting that you can.  o For more tips, go to www.cdc.gov/coronavirus/2019-ncov/downloads/10Things.pdf.      How can I take care of myself?   Get lots of rest. Drink extra fluids (unless a doctor has told you not to)   Take Tylenol (acetaminophen) for fever or pain. If you have liver or kidney problems, ask your family doctor if it's okay to take Tylenol.   Adults can take either:    650 mg (two 325 mg pills) every 4 to 6 hours, or...   1,000 mg (two 500 mg pills) every 8 hours as needed.    Note: Don't take more than 3,000 mg in one day. Acetaminophen is found in many medicines (both prescribed and over-the-counter medicines). Read all labels to be sure you don't take too much.   For children, check the Tylenol bottle for the right dose. The dose is based on the child's age or weight.    If you have other health problems (like cancer, heart failure, an organ transplant or severe kidney disease): Call your specialty clinic if you don't feel better in the next 2 days.       Know when to call 911. Emergency warning signs include:    Trouble breathing or shortness of breath Pain or pressure in the chest that doesn't go away Feeling confused like you haven't felt before, or not being able to wake up Bluish-colored lips or face  Where can I get more information?    [facility] -- About COVID-19: [email or web address]   CDC -- What to Do If You're Sick: www.cdc.gov/coronavirus/2019-ncov/about/steps-when-sick.html   CDC -- Ending Home Isolation: www.cdc.gov/coronavirus/2019-ncov/hcp/disposition-in-home-patients.html   [facility]  -- Caring for Someone: www.cdc.gov/coronavirus/2019-ncov/if-you-are-sick/care-for-someone.html   [facility] -- Interim Guidance for Hospital Discharge to Home: www.health.[facility].[place].us/diseases/coronavirus/hcp/hospdischarge.pdf   [facility] clinical trials (COVID-19 research studies): clinicalaffairs.[facility]-clinical-trials    Below are the COVID-19 hotlines at the [place] Department of Health ([facility]). Interpreters are available.    For health questions: Call 607-419-4136 or 1-709.927.3846 (7 a.m. to 7 p.m.) For questions about schools and childcare: Call 968-725-6098 or 1-581.995.3393 (7 a.m. to 7 p.m.)       Diagnosis: Contact with and (suspected) exposure to other viral communicable diseases  Diagnosis ICD: Z20.828

## 2021-03-25 ENCOUNTER — OFFICE VISIT (OUTPATIENT)
Dept: FAMILY MEDICINE | Facility: CLINIC | Age: 56
End: 2021-03-25
Payer: COMMERCIAL

## 2021-03-25 ENCOUNTER — ANCILLARY PROCEDURE (OUTPATIENT)
Dept: GENERAL RADIOLOGY | Facility: CLINIC | Age: 56
End: 2021-03-25
Attending: FAMILY MEDICINE
Payer: COMMERCIAL

## 2021-03-25 VITALS
TEMPERATURE: 98.7 F | OXYGEN SATURATION: 97 % | BODY MASS INDEX: 34.9 KG/M2 | HEART RATE: 82 BPM | DIASTOLIC BLOOD PRESSURE: 83 MMHG | SYSTOLIC BLOOD PRESSURE: 122 MMHG | WEIGHT: 197 LBS

## 2021-03-25 DIAGNOSIS — R22.31 SUBCUTANEOUS MASS OF RIGHT UPPER EXTREMITY: ICD-10-CM

## 2021-03-25 DIAGNOSIS — M25.512 ACUTE PAIN OF LEFT SHOULDER: ICD-10-CM

## 2021-03-25 DIAGNOSIS — Z12.11 SCREENING FOR COLON CANCER: ICD-10-CM

## 2021-03-25 DIAGNOSIS — Z85.3 HX: BREAST CANCER: ICD-10-CM

## 2021-03-25 DIAGNOSIS — M25.512 ACUTE PAIN OF LEFT SHOULDER: Primary | ICD-10-CM

## 2021-03-25 DIAGNOSIS — Z12.31 ENCOUNTER FOR SCREENING MAMMOGRAM FOR MALIGNANT NEOPLASM OF BREAST: ICD-10-CM

## 2021-03-25 PROCEDURE — 73030 X-RAY EXAM OF SHOULDER: CPT | Mod: LT | Performed by: RADIOLOGY

## 2021-03-25 PROCEDURE — 99214 OFFICE O/P EST MOD 30 MIN: CPT | Mod: GC | Performed by: STUDENT IN AN ORGANIZED HEALTH CARE EDUCATION/TRAINING PROGRAM

## 2021-03-25 NOTE — PROGRESS NOTES
[facility] Clinic Visit    Assessment/Plan:  Martha was seen today for shoulder pain, shoulder, mass and radiology visit.    Diagnoses and all orders for this visit:    Acute pain of left shoulder  I have a suspicion that this is related to rotator cuff pathology given positive provacative tests. I would like MRI; but unsure about feasibility with her chemo port in place. We discussed having this removed prior to MRI; she is amenable to this plan. XR of the left shoulder today showed normal GH joint space.  -     XR Shoulder Left G/E 3 Views; Future    Subcutaneous mass of right upper extremity  Mobile subcutaneous mass just superior to the R medial epicondyle, slightly TTP. Not in area where I would expect bursitis and I think too deep for a cyst. I wonder if this is lipoma vs schwannoma. Leaning towards schwannoma given reproducible neurologic symptoms with manipulation. Will evaluate with US; if she desires this removed she will need a surgical referral.  -     US MSK Limited; Future    HX: breast cancer  Encounter for screening mammogram for malignant neoplasm of breast  She is due for yearly mammogram. She also still has her left upper chest chemo port which is ~15 years old. She would like this removed. Unclear to me if she has been BRCA tested. Will need to discuss at follow up visit.   -     MA DIAGNOSTIC  DIGITAL BILATERAL; Future  -     GENERAL SURG ADULT REFERRAL; Future    Screening for colon cancer  -     GASTROENTEROLOGY ADULT REF PROCEDURE ONLY; Future    Options for treatment and follow-up care were reviewed with the patient who was engaged and actively involved in the decision making process, verbalized understanding of the options discussed, and satisfied with the final plan.    Patient was staffed with supervising physician, Dr. Campos.     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    1) Noticed a lump above her right elbow ~6 months ago. It is painful and she sometimes noticed that is seems warm. No overlying skin redness that she has noticed. She thinks that it has grown in size since she first noticed. She has numbness in the right arm, but she says that this has been ongoing since her R radical mastectomy.      2) Also having several months of left shoulder pain. She notes that she feels like the pain is \"inside the shoulder\". She traces a line roughly from the spine of the scapula towards the coracoid as where she feels the pain mostly. She also noticed weakness of the L  Arm/shoulder when carrying groceries; feels like her shoulder is going to give out. She does not recall a specific injury, but does wrestle and play with her grandchildren. She had conservative treatment (chemo/rad) to the left breast and had L axillary LN dissection. Due to this she has chronic lymphedema of the L arm; this has not changed.     3) She had R mastectomy and conservative L breast tx ~15 years ago in [place]. These records are not available to me. She is due for her mammogram; she gets these yearly. She has not noticed any new lumps; no L breast pain. She also is due for her colonoscopy. Her Left upper chest chemo port is still in place; she would like this removed.     ROS:   A complete 12-point ROS was obtained and was negative except as stated " above in HPI.    Objective:  Vitals:    03/25/21 0854   BP: 122/83   BP Location: Right arm   Patient Position: Sitting   Cuff Size: Adult Regular   Pulse: 82   Temp: 98.7  F (37.1  C)   TempSrc: Oral   SpO2: 97%   Weight: 89.4 kg (197 lb)     Body mass index is 34.9 kg/m .    GEN: NAD, healthy, alert  EYES: grossly normal to inspection, normal conjunctivae/sclerae  NECK: no, asymmetry, masses, scars  CHEST WALL: chemo port visible just under the skin of the left upper anterior chest wall  RESP: breathing comfortably on RA  LYMPH: No R or L axillary lymphadenopathy   MSK: Left shoulder with no TTP of GH joint line; left clavicle in good alignment. Positive empty can test, positive hawking test. Negative drop arm Full strength of left UE.   2-3 cm subcutaneous mass, mobile, just superior to the medial epicondyle of the R arm; it is slightly TTP; able to provoke pain radiation into the forearm with palpation.   PSYCH: mentation appears normal, affect normal/bright    No results found for this or any previous visit (from the past 24 hour(s)).

## 2021-03-25 NOTE — PATIENT INSTRUCTIONS
General Surgery should call you to set up in appointment    You should also get a call to schedule the ultrasound for the right arm    Let's wait until the port is taken care of before doing the MRI.    03/26/21  MAMMOGRAM  [facility]   [address]  [address]  [place] [address]    Phone: [phone]  Fax: [phone]    Demographics and orders faxed to [phone]. They will contact patient to schedule.     US MSK  [facility]  Phone: [phone]  Fax: [phone]     Faxed order and demographics. They will contact patient to schedule.     03/26/21   GENERAL SURGERY REFERRAL    [facility]   Phone: [phone]  Fax: [phone]    Referral, demographics and office visit have been faxed to [phone]. They will contact patient to schedule.    Liseth Martino

## 2021-03-26 ENCOUNTER — TELEPHONE (OUTPATIENT)
Dept: GASTROENTEROLOGY | Facility: CLINIC | Age: 56
End: 2021-03-26

## 2021-03-26 NOTE — PROGRESS NOTES
Preceptor Attestation:    I discussed the patient with the resident and evaluated the patient in person. I have verified the content of the note, which accurately reflects my assessment of the patient and the plan of care.   Supervising Physician:  Mamadou Campos MD.                    Complex Repair And Dermal Autograft Text: The defect edges were debeveled with a #15 scalpel blade.  The primary defect was closed partially with a complex linear closure.  Given the location of the defect, shape of the defect and the proximity to free margins an dermal autograft was deemed most appropriate to repair the remaining defect.  The graft was trimmed to fit the size of the remaining defect.  The graft was then placed in the primary defect, oriented appropriately, and sutured into place.

## 2021-03-26 NOTE — TELEPHONE ENCOUNTER
1st schedule attempt    Procedure: Lower Endoscopy    Preferred Location: [facility]    Scheduling Instructions: If you have not heard from the scheduling office within 2 business days, please call [phone].           Associated Diagnoses    Screening for colon cancer [Z12.11]

## 2021-04-03 ENCOUNTER — IMMUNIZATION (OUTPATIENT)
Dept: FAMILY MEDICINE | Facility: CLINIC | Age: 56
End: 2021-04-03
Payer: COMMERCIAL

## 2021-04-03 PROCEDURE — 91303 PR COVID VAC JANSSEN AD26 0.5ML: CPT

## 2021-04-03 PROCEDURE — 0031A PR COVID VAC JANSSEN AD26 0.5ML: CPT

## 2021-04-07 ENCOUNTER — TELEPHONE (OUTPATIENT)
Dept: FAMILY MEDICINE | Facility: CLINIC | Age: 56
End: 2021-04-07

## 2021-04-07 NOTE — TELEPHONE ENCOUNTER
[facility] Clinic phone call message- general phone call:    Reason for call: Pt came in last Saturday and received the Covid vaccine.  Every since then she has had fever, chills, nausea,dizzy, finger and toes are numb, medal taste in her mouth, fatigue, weird sense of taste and numbness under her armpit.    Return call needed: Yes    OK to leave a message on voice mail? Yes    Primary language: English      needed? No    Call taken on April 7, 2021 at 1:49 PM by Karuna Silva

## 2021-04-08 ENCOUNTER — OFFICE VISIT (OUTPATIENT)
Dept: FAMILY MEDICINE | Facility: CLINIC | Age: 56
End: 2021-04-08
Payer: COMMERCIAL

## 2021-04-08 VITALS
RESPIRATION RATE: 20 BRPM | DIASTOLIC BLOOD PRESSURE: 75 MMHG | WEIGHT: 194.6 LBS | BODY MASS INDEX: 34.47 KG/M2 | SYSTOLIC BLOOD PRESSURE: 129 MMHG | TEMPERATURE: 97.6 F | OXYGEN SATURATION: 96 % | HEART RATE: 85 BPM

## 2021-04-08 DIAGNOSIS — Z20.822 ENCOUNTER FOR LABORATORY TESTING FOR COVID-19 VIRUS: Primary | ICD-10-CM

## 2021-04-08 DIAGNOSIS — L29.9 ITCHING: ICD-10-CM

## 2021-04-08 DIAGNOSIS — M79.10 MYALGIA: ICD-10-CM

## 2021-04-08 LAB
SARS-COV-2 RNA RESP QL NAA+PROBE: NOT DETECTED
SPECIMEN SOURCE: NORMAL

## 2021-04-08 PROCEDURE — 99213 OFFICE O/P EST LOW 20 MIN: CPT | Mod: CS | Performed by: STUDENT IN AN ORGANIZED HEALTH CARE EDUCATION/TRAINING PROGRAM

## 2021-04-08 RX ORDER — ACETAMINOPHEN 325 MG/1
325-650 TABLET ORAL EVERY 6 HOURS PRN
Qty: 60 TABLET | Refills: 1 | Status: SHIPPED | OUTPATIENT
Start: 2021-04-08

## 2021-04-08 RX ORDER — DIPHENHYDRAMINE HCL 25 MG
25 TABLET ORAL EVERY 6 HOURS PRN
Qty: 30 TABLET | Refills: 0 | Status: SHIPPED | OUTPATIENT
Start: 2021-04-08

## 2021-04-08 NOTE — LETTER
RETURN TO WORK/SCHOOL FORM    4/8/2021    Re: Martha Caputo  1965      To Whom It May Concern:     Martha Caputo was seen in clinic today (4/8).  She may return to work without restrictions on 4/12/2021 if her symptoms are improved. She was testing for COVID-19 today. These results are pending at this time. If she is negative for COVID she may return to work on 4/12 as above.             Sruthi Monsivais MD  4/8/2021 1:23 PM

## 2021-04-08 NOTE — PATIENT INSTRUCTIONS
- We will  Be in contact regarding the surgery referral for port removal    - Take tylenol and benedryl as needed    - Off of work through 4/12, please call into clinic if still feeling sick. We will extend this if COVID test is positive      Your symptoms show that you may have coronavirus (COVID-19). This illness can cause fever, cough and trouble breathing. Many people get a mild case and get better on their own. Some people can get very sick.     Not all patients are tested for COVID-19. If you need to be tested, your care team will let you know.    How can I protect others?    Without a test, we can t know for sure that you have COVID-19. For safety, it s very important to follow these rules.    Stay home and away from others (self-isolate) until:    You ve had no fever--and no medicine that reduces fever--for 1 full day (24 hours), And     Your other symptoms have resolved (gotten better). For example, your cough or breathing has improved, And     At least 10 days have passed since your symptoms started.    During this time:    Stay in your own room (and use your own bathroom), if you can.    Stay away from others in your home. No hugging, kissing or shaking hands.    No visitors.    Don t go to work, school or anywhere else.     Clean  high touch  surfaces often (doorknobs, counters, handles, etc.). Use a household cleaning spray or wipes.    Cover your mouth and nose with a mask, tissue or washcloth to avoid spreading germs.    Wash your hands and face often. Use soap and water.    For more tips, go to https://www.cdc.gov/coronavirus/2019-ncov/downloads/10Things.pdf.    How can I take care of myself?    1. Get lots of rest. Drink extra fluids (unless a doctor has told you not to).     2. Take Tylenol (acetaminophen) for fever or pain. If you have liver or kidney problems, ask your family doctor if it's okay to take Tylenol.     Adults can take either:     650 mg (two 325 mg pills) every 4 to 6 hours,  or     1,000 mg (two 500 mg pills) every 8 hours as needed.     Note: Don't take more than 3,000 mg in one day.   Acetaminophen is found in many medicines (both prescribed and over-the-counter medicines). Read all labels to be sure you don't take too much.   For children, check the Tylenol bottle for the right dose. The dose is based on  the child's age or weight.    3. If you have other health problems (like cancer, heart failure, an organ transplant or severe kidney disease): Call your specialty clinic if you don't feel better in the next 2 days.    4. Know when to call 911: If your breathing is so bad that it keeps you from doing normal activities, call 911 or go to the emergency room. Tell them that you've been staying home and may have COVID-19.      Thank you for limiting contact with others, wearing a simple mask to cover your cough, practice good hand hygiene habits and accessing our virtual services where possible to limit the spread of this virus.    For more information about COVID19 and options for caring for yourself at home, please visit the CDC website at https://www.cdc.gov/coronavirus/2019-ncov/about/steps-when-sick.html  For more options for care at [facility], please visit our website at [email or web address] }

## 2021-04-08 NOTE — PROGRESS NOTES
Preceptor Attestation:    I discussed the patient with the resident and evaluated the patient in person. I have verified the content of the note, which accurately reflects my assessment of the patient and the plan of care.   Supervising Physician:  Edu Martinez MD.

## 2021-04-08 NOTE — PROGRESS NOTES
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)     Breast cancer screening     Breast pain, left     DJD (degenerative joint disease) of knee     Personal history of malignant neoplasm of breast     Primary osteoarthritis of both knees     Major depressive disorder, recurrent episode, moderate (H)     Atypical depressive disease     Closed fracture of shaft of humerus     Polysubstance abuse (H)     Bilateral knee pain      who presents with     Chief Complaint   Patient presents with     Allergies     Reaction to the covid vaccine 4/3/2021, feel sick, numbness on the fingers and foot     Covid 19 Testing     Letter Request     need a letter for work that doing the covid test   .  Received Loi and Loi vaccine on 4/3/21. Since this time has been unwell. She had subjective fevers for 3 days (4/4-4/7). She also has myalgias, headache, loss of taste and smell along with tingling in her fingers. She has not had any shortness of breath, cough or chest pain.  She does feel intermittently chilled and has sweating that feels different from hot flashes. She also has a metallic taste in her mouth but does have her smell preserved. 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 Tried Nyquil with some alleviation. Feels like she has scratching in her hairs and throat.    Was also seen at [facility] for E US today and is awaiting these results along with surgery referral for port removal.        REVIEW OF SYSTEMS     Head: No facial pain   Neck: Yes throat pain, No swallowing problems but some scratching  ENT: No ear pain and nasal congestion   Chest: No chest pain   GI: No constipation, diarrhea, no nausea or vomiting  Skin: No rash        OBJECTIVE     Vitals:    04/08/21 1255   BP: 129/75   Pulse: 85   Resp: 20   Temp: 97.6  F (36.4  C)   TempSrc: Tympanic   SpO2: 96%    Weight: 88.3 kg (194 lb 9.6 oz)       Constitutional: Awake, alert, cooperative, no apparent distress, and appears stated age. Appears well hydrated.  Eyes: Lids and lashes normal, sclera clear, conjunctiva normal.  ENT: Normocephalic, without obvious abnormality, atramatic, tonsils 2+ with no erythema or exudate, no lymphadenopathy    Lungs: No increased work of breathing, good air exchange, clear to auscultation bilaterally, no crackles or wheezing.  Cardiovascular: Regular rate and rhythm, normal S1 and S2, no S3 or S4, and no murmur noted.  Musculoskeletal: No redness, warmth, or swelling of the joints. Chronic lymphedema noted on L UE. Full range of motion noted.  Neurologic: Awake, alert, oriented to name, place and time.  Cranial nerves II-XII are grossly intact. Sensation in tact in bilateral upper and lower extremities. 5/5 strength throughout.  Skin: No rash      ASSESSMENT AND PLAN      Martha was seen today for allergies, covid 19 testing and letter request.    Diagnoses and all orders for this visit:    Encounter for laboratory testing for COVID-19 virus  Unclear if symptoms are secondary to COVID-19 or recent vaccination on 4/3. Exam reassuring today with normal VS and clear lungs, non toxic appearing. However, If she is positive may be candidate for IV antibody versus ivermectin therapy due to comorbidity such as cancer hx and should be addressed once testing results come back. Letter provided for work.   -     COVID-19 Virus PCR [facility] ([facility])    Myalgia  -     acetaminophen (TYLENOL) 325 MG tablet; Take 1-2 tablets (325-650 mg) by mouth every 6 hours as needed for mild pain    Itching  -     diphenhydrAMINE (BENADRYL) 25 MG tablet; Take 1 tablet (25 mg) by mouth every 6 hours as needed for itching or allergies      - Patient deemed to be safe to continue supportive cares at home  - Patient provided with the following education:    Instructions for Patients  Your symptoms show that you may have  coronavirus (COVID-19). This illness can cause fever, cough and trouble breathing. Many people get a mild case and get better on their own. Some people can get very sick.     Not all patients are tested for COVID-19. If you need to be tested, your care team will let you know.    How can I protect others?    Without a test, we can t know for sure that you have COVID-19. For safety, it s very important to follow these rules.    Stay home and away from others (self-isolate) until:    You ve had no fever--and no medicine that reduces fever--for 1 full day (24 hours), And     Your other symptoms have resolved (gotten better). For example, your cough or breathing has improved, And     At least 10 days have passed since your symptoms started.    During this time:    Stay in your own room (and use your own bathroom), if you can.    Stay away from others in your home. No hugging, kissing or shaking hands.    No visitors.    Don t go to work, school or anywhere else.     Clean  high touch  surfaces often (doorknobs, counters, handles, etc.). Use a household cleaning spray or wipes.    Cover your mouth and nose with a mask, tissue or washcloth to avoid spreading germs.    Wash your hands and face often. Use soap and water.    For more tips, go to https://www.cdc.gov/coronavirus/2019-ncov/downloads/10Things.pdf.    How can I take care of myself?    1. Get lots of rest. Drink extra fluids (unless a doctor has told you not to).     2. Take Tylenol (acetaminophen) for fever or pain. If you have liver or kidney problems, ask your family doctor if it's okay to take Tylenol.     Adults can take either:     650 mg (two 325 mg pills) every 4 to 6 hours, or     1,000 mg (two 500 mg pills) every 8 hours as needed.     Note: Don't take more than 3,000 mg in one day.   Acetaminophen is found in many medicines (both prescribed and over-the-counter medicines). Read all labels to be sure you don't take too much.   For children, check the  Tylenol bottle for the right dose. The dose is based on  the child's age or weight.    3. If you have other health problems (like cancer, heart failure, an organ transplant or severe kidney disease): Call your specialty clinic if you don't feel better in the next 2 days.    4. Know when to call 911: If your breathing is so bad that it keeps you from doing normal activities, call 911 or go to the emergency room. Tell them that you've been staying home and may have COVID-19.      Thank you for limiting contact with others, wearing a simple mask to cover your cough, practice good hand hygiene habits and accessing our virtual services where possible to limit the spread of this virus.    For more information about COVID19 and options for caring for yourself at home, please visit the CDC website at https://www.cdc.gov/coronavirus/2019-ncov/about/steps-when-sick.html  For more options for care at [facility], please visit our website at [email or web address] }     I discussed the patient with Dr. Edu Martinez MD who is in agreement with the assessment and plan.      Sruthi Monsivais MD PGY2

## 2021-04-08 NOTE — LETTER
RETURN TO WORK/SCHOOL FORM    4/8/2021    Re: Martha Caputo  1965      To Whom It May Concern,    Martha Caputo  presented to [facility] on 04/08/21 for symptoms of illness. She will need to be off of work through 4/12 for recovery.      Please contact us at [phone] if you have any questions.         Sincerely,    [facility]   Sruthi Monsivais MD  4/8/2021 1:19 PM

## 2021-04-09 DIAGNOSIS — R22.31 SUBCUTANEOUS MASS OF RIGHT UPPER EXTREMITY: ICD-10-CM

## 2021-09-29 ENCOUNTER — TELEPHONE (OUTPATIENT)
Dept: FAMILY MEDICINE | Facility: CLINIC | Age: 56
End: 2021-09-29

## 2021-09-29 NOTE — TELEPHONE ENCOUNTER
[facility] Medicine phone call message- general phone call:    Reason for call:     Pt is needing a referral for her to see someone in [place] (currently there for her daughter) to see the port that was put in when she had breast cancer. She states she was suppose to have it taken out a while ago, but because of the pandemic, she's been holding off. She also thinks that it's starting to cause issues and some pain.     Pt states she's unsure when she will be able to return.     Action desired:     Call back     Return call needed: Yes    OK to leave a message on voice mail? Yes    Advised patient to response may take up to 2 business days: Yes    Primary language: English      needed? No    Call taken on September 29, 2021 at 9:12 AM by Kenya Temple

## 2021-10-01 ENCOUNTER — TELEPHONE (OUTPATIENT)
Dept: SURGERY | Facility: CLINIC | Age: 56
End: 2021-10-01

## 2021-10-01 NOTE — TELEPHONE ENCOUNTER
Martha called in regarding a referral for a port removal that was placed years ago in [facility]. I let her know I would send a request to the two surgeons and inquire on who would be able to assist with this. One I hear back from them or get an order I can help her schedule.

## 2021-10-05 ENCOUNTER — PREP FOR PROCEDURE (OUTPATIENT)
Dept: SURGERY | Facility: CLINIC | Age: 56
End: 2021-10-05

## 2021-10-05 DIAGNOSIS — Z95.828 PORT-A-CATH IN PLACE: Primary | ICD-10-CM

## 2021-10-05 NOTE — TELEPHONE ENCOUNTER
Spoke with Dr. Estrada who agreed to do the port removal for Martha. Order was sent for signature. Scheduled for 10/22 at [facility]. Patient will be taking care of the covid testing herself, understands it needs to be done 4 days prior and PCR rated.

## 2021-10-07 DIAGNOSIS — Z11.59 ENCOUNTER FOR SCREENING FOR OTHER VIRAL DISEASES: ICD-10-CM

## 2021-10-07 PROBLEM — Z95.828 PORT-A-CATH IN PLACE: Status: ACTIVE | Noted: 2021-10-07

## 2021-10-22 ENCOUNTER — HOSPITAL ENCOUNTER (OUTPATIENT)
Facility: AMBULATORY SURGERY CENTER | Age: 56
End: 2021-10-22
Attending: SPECIALIST
Payer: COMMERCIAL

## 2021-10-22 VITALS
HEIGHT: 62 IN | BODY MASS INDEX: 36.44 KG/M2 | RESPIRATION RATE: 16 BRPM | WEIGHT: 198 LBS | HEART RATE: 69 BPM | TEMPERATURE: 97.1 F | OXYGEN SATURATION: 99 % | SYSTOLIC BLOOD PRESSURE: 131 MMHG | DIASTOLIC BLOOD PRESSURE: 76 MMHG

## 2021-10-22 DIAGNOSIS — Z95.828 PORT-A-CATH IN PLACE: ICD-10-CM

## 2021-10-22 PROCEDURE — 36590 REMOVAL TUNNELED CV CATH: CPT | Performed by: SPECIALIST

## 2021-10-22 RX ORDER — ACETAMINOPHEN 325 MG/1
650 TABLET ORAL
Status: DISCONTINUED | OUTPATIENT
Start: 2021-10-22 | End: 2021-10-23 | Stop reason: HOSPADM

## 2021-10-22 RX ORDER — LIDOCAINE HYDROCHLORIDE 10 MG/ML
INJECTION, SOLUTION EPIDURAL; INFILTRATION; INTRACAUDAL; PERINEURAL PRN
Status: DISCONTINUED | OUTPATIENT
Start: 2021-10-22 | End: 2021-10-22 | Stop reason: HOSPADM

## 2021-10-22 RX ADMIN — ACETAMINOPHEN 650 MG: 325 TABLET ORAL at 12:29

## 2021-10-22 ASSESSMENT — MIFFLIN-ST. JEOR: SCORE: 1446.37

## 2021-10-22 NOTE — OP NOTE
Operative Note    Name:  Martha Caputo  PCP:  Joby Means  Procedure Date:  10/22/2021       Procedure:  Procedure(s):  REMOVAL, CATHETER, WITH PORT     Pre-Procedure Diagnosis:  Port-A-Cath in place [Z95.828]     Post-Procedure Diagnosis:    Same     Surgeon(s):  Ursula Estrada MD             Anesthesia Type:  Local      Findings:      Operative Report:    The patient was brought to operating suite where she was placed in supine position.  She is prepped draped in a sterile fashion.  After infiltration with 1% lidocaine I re-incised the incision site where the port was placed.  This is carried down to the port with electrocautery and the port as well as the capsule that had formed around the port was removed easily and completely with electrocautery.  The wound was inspected for hemostasis.  The wound was closed with 3-0 Vicryl to the subcutaneous tissues and subcuticular stitch of 4 Monocryl to the skin.  Sterile dressings are placed.  The patient tolerated the procedure well.    Estimated Blood Loss:   None        Specimens:    None       Drains:   None    Complications:    None    Ursula Estrada MD     Date: 10/22/2021  Time: 12:18 PM

## 2023-11-22 NOTE — PATIENT INSTRUCTIONS
Nilda Couch called on behave of PT. [employer] will be faxing over order form for ptot and speech.  PT is declining   Fax number [phone] Thank you for coming to clinic today.  Please do not hesitate to call or return if you have any questions.    - Call [phone] for Rule 25 intake    -  medications, avoid other NSAIDs such as ibuprofen or aleve while on relafen.  - Continue tylenol 1000mg three times daily  - Schedule physical therapy referral  - Return in a few weeks for follow-up or sooner if you have new concerns    Sincerely,  Dr. Hwang